# Patient Record
Sex: FEMALE | Race: WHITE | NOT HISPANIC OR LATINO
[De-identification: names, ages, dates, MRNs, and addresses within clinical notes are randomized per-mention and may not be internally consistent; named-entity substitution may affect disease eponyms.]

---

## 2017-12-13 ENCOUNTER — RX ONLY (OUTPATIENT)
Age: 52
Setting detail: RX ONLY
End: 2017-12-13

## 2017-12-13 ENCOUNTER — APPOINTMENT (RX ONLY)
Dept: URBAN - METROPOLITAN AREA CLINIC 31 | Facility: CLINIC | Age: 52
Setting detail: DERMATOLOGY
End: 2017-12-13

## 2017-12-13 DIAGNOSIS — L57.3 POIKILODERMA OF CIVATTE: ICD-10-CM

## 2017-12-13 DIAGNOSIS — L70.0 ACNE VULGARIS: ICD-10-CM

## 2017-12-13 DIAGNOSIS — L57.8 OTHER SKIN CHANGES DUE TO CHRONIC EXPOSURE TO NONIONIZING RADIATION: ICD-10-CM

## 2017-12-13 DIAGNOSIS — L82.1 OTHER SEBORRHEIC KERATOSIS: ICD-10-CM

## 2017-12-13 DIAGNOSIS — D18.0 HEMANGIOMA: ICD-10-CM

## 2017-12-13 DIAGNOSIS — D22 MELANOCYTIC NEVI: ICD-10-CM

## 2017-12-13 DIAGNOSIS — L81.4 OTHER MELANIN HYPERPIGMENTATION: ICD-10-CM

## 2017-12-13 PROBLEM — L57.0 ACTINIC KERATOSIS: Status: ACTIVE | Noted: 2017-12-13

## 2017-12-13 PROBLEM — D22.71 MELANOCYTIC NEVI OF RIGHT LOWER LIMB, INCLUDING HIP: Status: ACTIVE | Noted: 2017-12-13

## 2017-12-13 PROBLEM — D22.72 MELANOCYTIC NEVI OF LEFT LOWER LIMB, INCLUDING HIP: Status: ACTIVE | Noted: 2017-12-13

## 2017-12-13 PROBLEM — D18.01 HEMANGIOMA OF SKIN AND SUBCUTANEOUS TISSUE: Status: ACTIVE | Noted: 2017-12-13

## 2017-12-13 PROBLEM — D22.5 MELANOCYTIC NEVI OF TRUNK: Status: ACTIVE | Noted: 2017-12-13

## 2017-12-13 PROCEDURE — ? OBSERVATION AND MEASURE

## 2017-12-13 PROCEDURE — 99213 OFFICE O/P EST LOW 20 MIN: CPT

## 2017-12-13 PROCEDURE — ? COUNSELING

## 2017-12-13 RX ORDER — CLINDAMYCIN PHOSPHATE AND BENZOYL PEROXIDE 10; 50 MG/G; MG/G
GEL TOPICAL
Qty: 1 | Refills: 2 | Status: ERX | COMMUNITY
Start: 2017-12-13

## 2017-12-13 ASSESSMENT — LOCATION ZONE DERM
LOCATION ZONE: ARM
LOCATION ZONE: NECK
LOCATION ZONE: FACE
LOCATION ZONE: TOE
LOCATION ZONE: TRUNK

## 2017-12-13 ASSESSMENT — LOCATION DETAILED DESCRIPTION DERM
LOCATION DETAILED: UPPER STERNUM
LOCATION DETAILED: LEFT DISTAL DORSAL FOREARM
LOCATION DETAILED: LEFT LATERAL 4TH TOE
LOCATION DETAILED: LEFT INFERIOR ANTERIOR NECK
LOCATION DETAILED: RIGHT DISTAL DORSAL FOREARM
LOCATION DETAILED: RIGHT SUPERIOR MEDIAL MIDBACK
LOCATION DETAILED: INFERIOR THORACIC SPINE
LOCATION DETAILED: RIGHT PROXIMAL DORSAL FOREARM
LOCATION DETAILED: RIGHT MEDIAL 2ND TOE
LOCATION DETAILED: STERNAL NOTCH
LOCATION DETAILED: LEFT PROXIMAL DORSAL FOREARM
LOCATION DETAILED: LEFT INFERIOR MEDIAL MIDBACK
LOCATION DETAILED: LEFT INFERIOR CENTRAL MALAR CHEEK
LOCATION DETAILED: RIGHT INFERIOR CENTRAL MALAR CHEEK
LOCATION DETAILED: LEFT CENTRAL BUCCAL CHEEK

## 2017-12-13 ASSESSMENT — LOCATION SIMPLE DESCRIPTION DERM
LOCATION SIMPLE: RIGHT LOWER BACK
LOCATION SIMPLE: LEFT ANTERIOR NECK
LOCATION SIMPLE: LEFT CHEEK
LOCATION SIMPLE: RIGHT 2ND TOE
LOCATION SIMPLE: LEFT LOWER BACK
LOCATION SIMPLE: RIGHT FOREARM
LOCATION SIMPLE: UPPER BACK
LOCATION SIMPLE: RIGHT CHEEK
LOCATION SIMPLE: LEFT 4TH TOE
LOCATION SIMPLE: LEFT FOREARM
LOCATION SIMPLE: CHEST

## 2019-01-03 ENCOUNTER — APPOINTMENT (RX ONLY)
Dept: URBAN - METROPOLITAN AREA CLINIC 20 | Facility: CLINIC | Age: 54
Setting detail: DERMATOLOGY
End: 2019-01-03

## 2019-01-03 DIAGNOSIS — L663 OTHER SPECIFIED DISEASES OF HAIR AND HAIR FOLLICLES: ICD-10-CM

## 2019-01-03 DIAGNOSIS — L738 OTHER SPECIFIED DISEASES OF HAIR AND HAIR FOLLICLES: ICD-10-CM

## 2019-01-03 DIAGNOSIS — Z71.89 OTHER SPECIFIED COUNSELING: ICD-10-CM

## 2019-01-03 DIAGNOSIS — L81.4 OTHER MELANIN HYPERPIGMENTATION: ICD-10-CM

## 2019-01-03 DIAGNOSIS — L73.9 FOLLICULAR DISORDER, UNSPECIFIED: ICD-10-CM

## 2019-01-03 DIAGNOSIS — L82.1 OTHER SEBORRHEIC KERATOSIS: ICD-10-CM

## 2019-01-03 DIAGNOSIS — L57.0 ACTINIC KERATOSIS: ICD-10-CM

## 2019-01-03 DIAGNOSIS — D18.0 HEMANGIOMA: ICD-10-CM

## 2019-01-03 DIAGNOSIS — D22 MELANOCYTIC NEVI: ICD-10-CM

## 2019-01-03 PROBLEM — D22.72 MELANOCYTIC NEVI OF LEFT LOWER LIMB, INCLUDING HIP: Status: ACTIVE | Noted: 2019-01-03

## 2019-01-03 PROBLEM — D22.71 MELANOCYTIC NEVI OF RIGHT LOWER LIMB, INCLUDING HIP: Status: ACTIVE | Noted: 2019-01-03

## 2019-01-03 PROBLEM — D22.62 MELANOCYTIC NEVI OF LEFT UPPER LIMB, INCLUDING SHOULDER: Status: ACTIVE | Noted: 2019-01-03

## 2019-01-03 PROBLEM — D22.5 MELANOCYTIC NEVI OF TRUNK: Status: ACTIVE | Noted: 2019-01-03

## 2019-01-03 PROBLEM — D22.61 MELANOCYTIC NEVI OF RIGHT UPPER LIMB, INCLUDING SHOULDER: Status: ACTIVE | Noted: 2019-01-03

## 2019-01-03 PROBLEM — D18.01 HEMANGIOMA OF SKIN AND SUBCUTANEOUS TISSUE: Status: ACTIVE | Noted: 2019-01-03

## 2019-01-03 PROBLEM — L02.223 FURUNCLE OF CHEST WALL: Status: ACTIVE | Noted: 2019-01-03

## 2019-01-03 PROCEDURE — ? COUNSELING

## 2019-01-03 PROCEDURE — 17000 DESTRUCT PREMALG LESION: CPT

## 2019-01-03 PROCEDURE — 99214 OFFICE O/P EST MOD 30 MIN: CPT | Mod: 25

## 2019-01-03 PROCEDURE — ? LIQUID NITROGEN

## 2019-01-03 PROCEDURE — ? ADDITIONAL NOTES

## 2019-01-03 ASSESSMENT — LOCATION DETAILED DESCRIPTION DERM
LOCATION DETAILED: RIGHT PROXIMAL DORSAL FOREARM
LOCATION DETAILED: RIGHT ANTERIOR DISTAL THIGH
LOCATION DETAILED: LEFT ANTERIOR DISTAL THIGH
LOCATION DETAILED: RIGHT ANTERIOR PROXIMAL THIGH
LOCATION DETAILED: NASAL SUPRATIP
LOCATION DETAILED: LEFT PROXIMAL DORSAL FOREARM
LOCATION DETAILED: LEFT MEDIAL SUPERIOR CHEST
LOCATION DETAILED: RIGHT MID-UPPER BACK
LOCATION DETAILED: LEFT FOREHEAD
LOCATION DETAILED: LEFT ANTERIOR PROXIMAL THIGH
LOCATION DETAILED: LEFT ANTERIOR PROXIMAL UPPER ARM
LOCATION DETAILED: XIPHOID
LOCATION DETAILED: RIGHT INFERIOR UPPER BACK
LOCATION DETAILED: LEFT INFERIOR CENTRAL MALAR CHEEK
LOCATION DETAILED: RIGHT ANTERIOR PROXIMAL UPPER ARM
LOCATION DETAILED: UPPER STERNUM
LOCATION DETAILED: RIGHT SUPERIOR MEDIAL UPPER BACK

## 2019-01-03 ASSESSMENT — LOCATION ZONE DERM
LOCATION ZONE: FACE
LOCATION ZONE: NOSE
LOCATION ZONE: ARM
LOCATION ZONE: LEG
LOCATION ZONE: TRUNK

## 2019-01-03 ASSESSMENT — LOCATION SIMPLE DESCRIPTION DERM
LOCATION SIMPLE: LEFT CHEEK
LOCATION SIMPLE: LEFT UPPER ARM
LOCATION SIMPLE: LEFT FOREHEAD
LOCATION SIMPLE: RIGHT FOREARM
LOCATION SIMPLE: RIGHT UPPER ARM
LOCATION SIMPLE: CHEST
LOCATION SIMPLE: LEFT THIGH
LOCATION SIMPLE: ABDOMEN
LOCATION SIMPLE: RIGHT UPPER BACK
LOCATION SIMPLE: RIGHT THIGH
LOCATION SIMPLE: NOSE
LOCATION SIMPLE: LEFT FOREARM

## 2020-08-27 ENCOUNTER — OFFICE VISIT (OUTPATIENT)
Dept: CARDIOLOGY | Facility: CLINIC | Age: 55
End: 2020-08-27
Payer: COMMERCIAL

## 2020-08-27 VITALS
BODY MASS INDEX: 27.14 KG/M2 | OXYGEN SATURATION: 96 % | WEIGHT: 159 LBS | DIASTOLIC BLOOD PRESSURE: 100 MMHG | SYSTOLIC BLOOD PRESSURE: 178 MMHG | HEIGHT: 64 IN | HEART RATE: 82 BPM

## 2020-08-27 DIAGNOSIS — F41.9 ANXIETY: ICD-10-CM

## 2020-08-27 DIAGNOSIS — R07.89 OTHER CHEST PAIN: ICD-10-CM

## 2020-08-27 DIAGNOSIS — E78.2 MIXED HYPERLIPIDEMIA: ICD-10-CM

## 2020-08-27 DIAGNOSIS — E78.49 FAMILIAL HYPERLIPIDEMIA, HIGH LDL: ICD-10-CM

## 2020-08-27 DIAGNOSIS — R73.01 IFG (IMPAIRED FASTING GLUCOSE): ICD-10-CM

## 2020-08-27 DIAGNOSIS — Z82.49 FAMILY HISTORY OF CORONARY ARTERY DISEASE IN FATHER: ICD-10-CM

## 2020-08-27 DIAGNOSIS — I10 ESSENTIAL HYPERTENSION: ICD-10-CM

## 2020-08-27 LAB — EKG IMPRESSION: NORMAL

## 2020-08-27 PROCEDURE — 93000 ELECTROCARDIOGRAM COMPLETE: CPT | Performed by: INTERNAL MEDICINE

## 2020-08-27 PROCEDURE — 99204 OFFICE O/P NEW MOD 45 MIN: CPT | Performed by: INTERNAL MEDICINE

## 2020-08-27 RX ORDER — FLUTICASONE PROPIONATE 50 UG/1
SPRAY, METERED NASAL PRN
COMMUNITY
Start: 2020-06-02

## 2020-08-27 RX ORDER — OMEPRAZOLE 20 MG/1
TABLET, DELAYED RELEASE ORAL PRN
COMMUNITY
Start: 2020-07-31

## 2020-08-27 RX ORDER — CHLORTHALIDONE 50 MG/1
TABLET ORAL PRN
COMMUNITY
Start: 2020-08-04 | End: 2023-06-14

## 2020-08-27 NOTE — LETTER
[facility] [place] [facility] [address],   [address]nd [place] [address]  Phone: [phone]  Fax: [phone]              Susan Sweeney  1965    Encounter Date: 2020    Sachi Low M.D.          PROGRESS NOTE:  Subjective:   Chief Complaint:   Chief Complaint   Patient presents with   • Chest Pain       Susan Sweeney is a 54 y.o. female who is self-referred for chest pain, hyperlipidemia, FH CAD.    Saw cardiology before with stress test, normal, remote.    Notes when she lies on her right side, she has a strange feeling, feels like a squeezing, sometimes sharp.  PCP gave her PPI, did help a little bit but returning.    Get some right arm sensations, tingling, has some shoulder issues.  Eval in ED For this in 2016, trops normal x 3.    Gets occasional skipped beats, happening for years, not alarming, no other sx.    Has some anxiety, uses xanax sparingly, does help.    Works out daily, up to 2 miles, some intensity for 15 sec, then 45 sec rest.  She is not limited by chest pain, pressure or tightness with activity.   No significant dyspnea on exertion, orthopnea or lower extremity swelling.   No lightheadedness, or presyncope/syncope.   No symptoms of leg claudication.   No stroke/TIA like symptoms.    Has hyperlipidemia, LDL and triglycerides are elevated, LDL is 172, HDL normal, not sure if fasting.  Tells me FU lipids have been normal.    Has hypertension, no meds.  Prior diuretics for edema related to menses.  Prior low K.    Mother  at 54 of MI, was a smoker.    No prior smoking history.  No history of diabetes.  No history of autoimmune disease such as lupus or rheumatoid arthritis.  No chronic kidney disease.  No ETOH overuse. Rare.  No caffeine overuse.  No recreation substance use.    Son, Ricardo,  of OD at 27. Was told he had an abnormal ECG but not taken seriously with hx drug use, was on meds for HTN.  .  Has  another son, Ananth.    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  L[place] to vacation in [facility].    DATA REVIEWED by me:  ECG 2020  Sinus, 75, borderline ST depression    Echo    Most recent labs:       Lab Results   Component Value Date/Time    HEMOGLOBIN 13.3 2016 07:20 AM    HEMATOCRIT 39.0 2016 07:20 AM    MCV 94.4 2016 07:20 AM      Lab Results   Component Value Date/Time    SODIUM 140 2016 07:20 AM    POTASSIUM 3.5 2016 07:20 AM    CHLORIDE 104 2016 07:20 AM    CO2 25 2016 07:20 AM    GLUCOSE 112 (H) 2016 07:20 AM    BUN 9 2016 07:20 AM    CREATININE 0.8 2016 07:20 AM      Lab Results   Component Value Date/Time    ASTSGOT 19 2016 03:25 PM    ALTSGPT 24 2016 03:25 PM    ALBUMIN 3.9 2016 03:25 PM      Lab Results   Component Value Date/Time    CHOLSTRLTOT 287 (H) 2016 07:20 AM     (H) 2016 07:20 AM    HDL 59.0 2016 07:20 AM    TRIGLYCERIDE 279 (H) 2016 07:20 AM           Past Medical History:   Diagnosis Date   • Migraine      Past Surgical History:   Procedure Laterality Date   • CYST EXCISION Bilateral     breasts   • HYSTERECTOMY LAPAROSCOPY      Partial hysterectomy      Family History   Problem Relation Age of Onset   • Heart Disease Mother          of MI at 54, smoker     Social History     Socioeconomic History   • Marital status:      Spouse name: Not on file   • Number of children: Not on file   • Years of education: Not on file   • Highest education level: Not on file   Occupational History   • Not on file   Social Needs   • Financial resource strain: Not on file   • Food insecurity     Worry: Not on file     Inability: Not on file   • Transportation needs     Medical: Not on file     Non-medical: Not on file   Tobacco Use   • Smoking status: Never Smoker   • Smokeless tobacco: Never Used   Substance and Sexual Activity   • Alcohol use: Yes     Comment:  "occasionally    • Drug use: No   • Sexual activity: Not on file   Lifestyle   • Physical activity     Days per week: Not on file     Minutes per session: Not on file   • Stress: Not on file   Relationships   • Social connections     Talks on phone: Not on file     Gets together: Not on file     Attends Quaker service: Not on file     Active member of club or organization: Not on file     Attends meetings of clubs or organizations: Not on file     Relationship status: Not on file   • Intimate partner violence     Fear of current or ex partner: Not on file     Emotionally abused: Not on file     Physically abused: Not on file     Forced sexual activity: Not on file   Other Topics Concern   • Not on file   Social History Narrative   • Not on file     Allergies   Allergen Reactions   • Sulfa Drugs Hives       Current Outpatient Medications   Medication Sig Dispense Refill   • chlorthalidone (HYGROTON) 50 MG Tab      • KLS ALLER-SHANTEL 50 MCG/ACT nasal spray      • KLS OMPERAZOLE 20 MG Tablet Delayed Response delayed-release tablet      • SUMAtriptan Succinate (IMITREX PO) Take  by mouth.     • Aspirin-Acetaminophen-Caffeine (EXCEDRIN PO) Take  by mouth.     • hydrochlorothiazide (HYDRODIURIL) 50 MG Tab Take 50 mg by mouth every day.     • vitamin D (CHOLECALCIFEROL) 1000 UNIT Tab Take 1,000 Units by mouth every day.     • alprazolam (XANAX) 0.25 MG Tab Take 0.25 mg by mouth at bedtime as needed for Sleep (patient takes 1/2 of 0.25).       No current facility-administered medications for this visit.        ROS  All others systems reviewed and negative.     Objective:     BP (!) 178/100 (BP Location: Right arm, Patient Position: Sitting)   Pulse 82   Ht 1.626 m (5' 4\")   Wt 72.1 kg (159 lb)   SpO2 96%  Body mass index is 27.29 kg/m².    General: No acute distress. Well nourished.  HEENT: EOM grossly intact, no scleral icterus, no pharyngeal erythema.   Neck:  No JVD, no bruits, trachea midline  CVS: RRR. Normal S1, " S2. No M/R/G. No LE edema.  2+ radial pulses, 2+ PT pulses  Resp: CTAB. No wheezing or crackles/rhonchi. Normal respiratory effort.  Abdomen: Soft, NT, no velma hepatomegaly.  MSK/Ext: No clubbing or cyanosis.  Skin: Warm and dry, no rashes.  Neurological: CN III-XII grossly intact. No focal deficits.   Psych: A&O x 3, appropriate affect, good judgement      Assessment:     1. Other chest pain  EKG   2. Mixed hyperlipidemia  Comp Metabolic Panel    Lipid Profile   3. Family history of coronary artery disease in father     4. Essential hypertension     5. Anxiety         Medical Decision Making:  Today's Assessment / Status / Plan:     -Her sx do not sound cardiac  -I am concerned about BP, she is certain it has been controlled before, will monitor it  -To reduce risk, she is near optimal, except considering statin with FH, I think a calcium score would be helpful.  -RTC 1 year      Written instructions given today:    Checking Blood Pressure:  -Blood pressure cuff, spend in the $40-65, with good return policy  -It should be automatic, upper arm, measure your arm to get the correct size, probably adult Large  -Put the cuff in place, rest arm on table near height of your heart, sit quietly for 5 min, legs uncrossed, with back support, then take your blood pressure, write it down, keep a log  -Check no more than 1 time day, maybe 4-5 times per week, try different times of day.  -Can bring your cuff to at least one appointment where it can be calibrated to a manual cuff if you are concerned.  -Goal blood pressure is at least under 130/80, ideally under 120/80.  If you think your BP is overall too high, let us know in the office, we can adjust medications, can use [employer] or call the [employer] office: [phone].    -Salt=sodium=sea salt, guidelines say stay under 2,500 mg daily but I ask for under 4,000 mg daily.  Get salt smart, start looking at labels, count it up.  Salt is hidden in everything, salad dressing, sauces,  cheese, most canned food, any processed meat.    -A calcium score is a low radiation dose CT scan to look for calcification of the heart arteries.  It is a screening tool to help assess cardiovascular risk.  It can only reveal calcified disease in the heart arteries, it is still possible to have dark plaque coronary disease that cannot be seen on the scan.  It cannot tell us if the disease is on the inside or outside of the vessel, only whether or not it is there (like a pregnancy test, it is either positive or negative). I use it as a tool to decide if statin therapy is indicated (such as Lipitor or Crestor) to help with primary prevention of heart attack.  Statin therapy provides anti-inflammatory therapy to stabilize plaque in the vesels reducing the risk of plaque rupture heart attack but in doing so often leads to further calcification of the heart arteries and therefore I do not repeat the scan as it can look worse after statin therapy.  If you have other indications to be on statin therapy then I generally do not recommend the test.  It is a personal decision.  It is an out of pocket expense of approximately $100 and can be ordered at any time.  If done at [facility], we can review the images together.  Let me know if you think this test is right for you.  Again, I typically only order it if it will /therapy, again such as starting statin therapy.    -We now know that lack of exercise is as risky as smoking or having diabetes in terms of your heart health.  Try to perform a moderate intensity exercise which includes brisk walking (swimming, cycling) at least 150 minutes a week or 30 minutes for 5 days in the week.  If you are able to perform more vigorous exercise, 70 minutes/week is sufficient. You have achieved your exercise goals.    -Take my blood work order and batch with your PCP blood work.    -You should always hear results of testing within 5 days with my interpretation, if you do not,  send a [employer] message or call the office: [phone].    Return in about 1 year (around 8/27/2021).    It is my pleasure to participate in the care of Ms. Sweeney.  Please do not hesitate to contact me with questions or concerns.    Sachi Low MD, [facility]  Cardiologist [facility]    Please note that this dictation was created using voice recognition software. I have made every reasonable attempt to correct obvious errors, but it is possible there are errors of grammar and possibly content that I did not discover before finalizing the note.      Bernard Decker M.D.  [address] [place] [address] [address] [place] [address]  Via Fax: [phone]

## 2020-08-27 NOTE — PATIENT INSTRUCTIONS
Checking Blood Pressure:  -Blood pressure cuff, spend in the $40-65, with good return policy  -It should be automatic, upper arm, measure your arm to get the correct size, probably adult Large  -Put the cuff in place, rest arm on table near height of your heart, sit quietly for 5 min, legs uncrossed, with back support, then take your blood pressure, write it down, keep a log  -Check no more than 1 time day, maybe 4-5 times per week, try different times of day.  -Can bring your cuff to at least one appointment where it can be calibrated to a manual cuff if you are concerned.  -Goal blood pressure is at least under 130/80, ideally under 120/80.  If you think your BP is overall too high, let us know in the office, we can adjust medications, can use [employer] or call the [employer] office: [phone].    -Salt=sodium=sea salt, guidelines say stay under 2,500 mg daily but I ask for under 4,000 mg daily.  Get salt smart, start looking at labels, count it up.  Salt is hidden in everything, salad dressing, sauces, cheese, most canned food, any processed meat.    -A calcium score is a low radiation dose CT scan to look for calcification of the heart arteries.  It is a screening tool to help assess cardiovascular risk.  It can only reveal calcified disease in the heart arteries, it is still possible to have dark plaque coronary disease that cannot be seen on the scan.  It cannot tell us if the disease is on the inside or outside of the vessel, only whether or not it is there (like a pregnancy test, it is either positive or negative). I use it as a tool to decide if statin therapy is indicated (such as Lipitor or Crestor) to help with primary prevention of heart attack.  Statin therapy provides anti-inflammatory therapy to stabilize plaque in the vesels reducing the risk of plaque rupture heart attack but in doing so often leads to further calcification of the heart arteries and therefore I do not repeat the scan as it can look  worse after statin therapy.  If you have other indications to be on statin therapy then I generally do not recommend the test.  It is a personal decision.  It is an out of pocket expense of approximately $100 and can be ordered at any time.  If done at [facility], we can review the images together.  Let me know if you think this test is right for you.  Again, I typically only order it if it will /therapy, again such as starting statin therapy.    -We now know that lack of exercise is as risky as smoking or having diabetes in terms of your heart health.  Try to perform a moderate intensity exercise which includes brisk walking (swimming, cycling) at least 150 minutes a week or 30 minutes for 5 days in the week.  If you are able to perform more vigorous exercise, 70 minutes/week is sufficient. You have achieved your exercise goals.    -Take my blood work order and batch with your PCP blood work.    -You should always hear results of testing within 5 days with my interpretation, if you do not, send a [employer] message or call the office: [phone].

## 2020-08-27 NOTE — PROGRESS NOTES
Subjective:   Chief Complaint:   Chief Complaint   Patient presents with   • Chest Pain       Susan Sweeney is a 54 y.o. female who is self-referred for chest pain, hyperlipidemia, FH CAD.    Saw cardiology before with stress test, normal, remote.    Notes when she lies on her right side, she has a strange feeling, feels like a squeezing, sometimes sharp.  PCP gave her PPI, did help a little bit but returning.    Get some right arm sensations, tingling, has some shoulder issues.  Eval in ED For this in 2016, trops normal x 3.    Gets occasional skipped beats, happening for years, not alarming, no other sx.    Has some anxiety, uses xanax sparingly, does help.    Works out daily, up to 2 miles, some intensity for 15 sec, then 45 sec rest.  She is not limited by chest pain, pressure or tightness with activity.   No significant dyspnea on exertion, orthopnea or lower extremity swelling.   No lightheadedness, or presyncope/syncope.   No symptoms of leg claudication.   No stroke/TIA like symptoms.    Has hyperlipidemia, LDL and triglycerides are elevated, LDL is 172, HDL normal, not sure if fasting.  Tells me FU lipids have been normal.    Has hypertension, no meds.  Prior diuretics for edema related to menses.  Prior low K.    Mother  at 54 of MI, was a smoker.    No prior smoking history.  No history of diabetes.  No history of autoimmune disease such as lupus or rheumatoid arthritis.  No chronic kidney disease.  No ETOH overuse. Rare.  No caffeine overuse.  No recreation substance use.    Son, Ricardo,  of OD at 27. Was told he had an abnormal ECG but not taken seriously with hx drug use, was on meds for HTN.  .  Has another son, Ananth.    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  [place] to vacation in [facility].    DATA REVIEWED by me:  ECG 2020  Sinus, 75, borderline ST depression    Echo none    Most recent labs:     2019 glucose 122, creatinine 0.78, sodium 140, potassium  3.9  2018 total cholesterol 304, HDL 87, triglycerides 116, , glucose 124, creatinine 0.73, potassium 3.3, sodium 138, LFTs normal, TSH 1.34  Lab Results   Component Value Date/Time    HEMOGLOBIN 13.3 2016 07:20 AM    HEMATOCRIT 39.0 2016 07:20 AM    MCV 94.4 2016 07:20 AM      Lab Results   Component Value Date/Time    SODIUM 140 2016 07:20 AM    POTASSIUM 3.5 2016 07:20 AM    CHLORIDE 104 2016 07:20 AM    CO2 25 2016 07:20 AM    GLUCOSE 112 (H) 2016 07:20 AM    BUN 9 2016 07:20 AM    CREATININE 0.8 2016 07:20 AM      Lab Results   Component Value Date/Time    ASTSGOT 19 2016 03:25 PM    ALTSGPT 24 2016 03:25 PM    ALBUMIN 3.9 2016 03:25 PM      Lab Results   Component Value Date/Time    CHOLSTRLTOT 287 (H) 2016 07:20 AM     (H) 2016 07:20 AM    HDL 59.0 2016 07:20 AM    TRIGLYCERIDE 279 (H) 2016 07:20 AM           Past Medical History:   Diagnosis Date   • Migraine      Past Surgical History:   Procedure Laterality Date   • CYST EXCISION Bilateral     breasts   • HYSTERECTOMY LAPAROSCOPY      Partial hysterectomy      Family History   Problem Relation Age of Onset   • Heart Disease Mother          of MI at 54, smoker     Social History     Socioeconomic History   • Marital status:      Spouse name: Not on file   • Number of children: Not on file   • Years of education: Not on file   • Highest education level: Not on file   Occupational History   • Not on file   Social Needs   • Financial resource strain: Not on file   • Food insecurity     Worry: Not on file     Inability: Not on file   • Transportation needs     Medical: Not on file     Non-medical: Not on file   Tobacco Use   • Smoking status: Never Smoker   • Smokeless tobacco: Never Used   Substance and Sexual Activity   • Alcohol use: Yes     Comment: occasionally    • Drug use: No   • Sexual activity: Not on file  "  Lifestyle   • Physical activity     Days per week: Not on file     Minutes per session: Not on file   • Stress: Not on file   Relationships   • Social connections     Talks on phone: Not on file     Gets together: Not on file     Attends Oriental orthodox service: Not on file     Active member of club or organization: Not on file     Attends meetings of clubs or organizations: Not on file     Relationship status: Not on file   • Intimate partner violence     Fear of current or ex partner: Not on file     Emotionally abused: Not on file     Physically abused: Not on file     Forced sexual activity: Not on file   Other Topics Concern   • Not on file   Social History Narrative   • Not on file     Allergies   Allergen Reactions   • Sulfa Drugs Hives       Current Outpatient Medications   Medication Sig Dispense Refill   • chlorthalidone (HYGROTON) 50 MG Tab      • KLS ALLER-SHANTEL 50 MCG/ACT nasal spray      • KLS OMPERAZOLE 20 MG Tablet Delayed Response delayed-release tablet      • SUMAtriptan Succinate (IMITREX PO) Take  by mouth.     • Aspirin-Acetaminophen-Caffeine (EXCEDRIN PO) Take  by mouth.     • hydrochlorothiazide (HYDRODIURIL) 50 MG Tab Take 50 mg by mouth every day.     • vitamin D (CHOLECALCIFEROL) 1000 UNIT Tab Take 1,000 Units by mouth every day.     • alprazolam (XANAX) 0.25 MG Tab Take 0.25 mg by mouth at bedtime as needed for Sleep (patient takes 1/2 of 0.25).       No current facility-administered medications for this visit.        ROS  All others systems reviewed and negative.     Objective:     BP (!) 178/100 (BP Location: Right arm, Patient Position: Sitting)   Pulse 82   Ht 1.626 m (5' 4\")   Wt 72.1 kg (159 lb)   SpO2 96%  Body mass index is 27.29 kg/m².    General: No acute distress. Well nourished.  HEENT: EOM grossly intact, no scleral icterus, no pharyngeal erythema.   Neck:  No JVD, no bruits, trachea midline  CVS: RRR. Normal S1, S2. No M/R/G. No LE edema.  2+ radial pulses, 2+ PT pulses  Resp: " CTAB. No wheezing or crackles/rhonchi. Normal respiratory effort.  Abdomen: Soft, NT, no velma hepatomegaly.  MSK/Ext: No clubbing or cyanosis.  Skin: Warm and dry, no rashes.  Neurological: CN III-XII grossly intact. No focal deficits.   Psych: A&O x 3, appropriate affect, good judgement      Assessment:     1. Other chest pain  EKG   2. Mixed hyperlipidemia  Comp Metabolic Panel    Lipid Profile   3. Family history of coronary artery disease in father     4. Essential hypertension     5. Anxiety     6. Familial hyperlipidemia, high LDL     7. IFG (impaired fasting glucose)         Medical Decision Making:  Today's Assessment / Status / Plan:     -Her sx do not sound cardiac  -I am concerned about BP, she is certain it has been controlled before, will monitor it  -To reduce risk, she is near optimal, except considering statin with FH, I think a calcium score would be helpful.  -RTC 1 year    Addendum: Labs received, LDL cholesterol 193 indicating need to treat cholesterol for familial hyperlipidemia, glucose 122 consistent with impaired fasting glucose.  I will request appointment change to 3-month follow-up.    Written instructions given today:    Checking Blood Pressure:  -Blood pressure cuff, spend in the $40-65, with good return policy  -It should be automatic, upper arm, measure your arm to get the correct size, probably adult Large  -Put the cuff in place, rest arm on table near height of your heart, sit quietly for 5 min, legs uncrossed, with back support, then take your blood pressure, write it down, keep a log  -Check no more than 1 time day, maybe 4-5 times per week, try different times of day.  -Can bring your cuff to at least one appointment where it can be calibrated to a manual cuff if you are concerned.  -Goal blood pressure is at least under 130/80, ideally under 120/80.  If you think your BP is overall too high, let us know in the office, we can adjust medications, can use MyChart or call the [facility]  office: [phone].    -Salt=sodium=sea salt, guidelines say stay under 2,500 mg daily but I ask for under 4,000 mg daily.  Get salt smart, start looking at labels, count it up.  Salt is hidden in everything, salad dressing, sauces, cheese, most canned food, any processed meat.    -A calcium score is a low radiation dose CT scan to look for calcification of the heart arteries.  It is a screening tool to help assess cardiovascular risk.  It can only reveal calcified disease in the heart arteries, it is still possible to have dark plaque coronary disease that cannot be seen on the scan.  It cannot tell us if the disease is on the inside or outside of the vessel, only whether or not it is there (like a pregnancy test, it is either positive or negative). I use it as a tool to decide if statin therapy is indicated (such as Lipitor or Crestor) to help with primary prevention of heart attack.  Statin therapy provides anti-inflammatory therapy to stabilize plaque in the vesels reducing the risk of plaque rupture heart attack but in doing so often leads to further calcification of the heart arteries and therefore I do not repeat the scan as it can look worse after statin therapy.  If you have other indications to be on statin therapy then I generally do not recommend the test.  It is a personal decision.  It is an out of pocket expense of approximately $100 and can be ordered at any time.  If done at [facility], we can review the images together.  Let me know if you think this test is right for you.  Again, I typically only order it if it will /therapy, again such as starting statin therapy.    -We now know that lack of exercise is as risky as smoking or having diabetes in terms of your heart health.  Try to perform a moderate intensity exercise which includes brisk walking (swimming, cycling) at least 150 minutes a week or 30 minutes for 5 days in the week.  If you are able to perform more vigorous exercise,  70 minutes/week is sufficient. You have achieved your exercise goals.    -Take my blood work order and batch with your PCP blood work.    -You should always hear results of testing within 5 days with my interpretation, if you do not, send a [employer] message or call the office: [phone].    Return in about 3 months (around 11/27/2020).    It is my pleasure to participate in the care of Ms. Sweeney.  Please do not hesitate to contact me with questions or concerns.    Sachi oLw MD, [facility]  Cardiologist [facility]    Please note that this dictation was created using voice recognition software. I have made every reasonable attempt to correct obvious errors, but it is possible there are errors of grammar and possibly content that I did not discover before finalizing the note.

## 2020-08-27 NOTE — LETTER
[facility] [address],   [address]nd [place] [address]  Phone: [phone]  Fax: [phone]              Susan Sweeney  1965    Encounter Date: 2020    Sachi Low M.D.          PROGRESS NOTE:  Subjective:   Chief Complaint:   Chief Complaint   Patient presents with   • Chest Pain       Susan Sweeney is a 54 y.o. female who is self-referred for chest pain, hyperlipidemia, FH CAD.    Saw cardiology before with stress test, normal, remote.    Notes when she lies on her right side, she has a strange feeling, feels like a squeezing, sometimes sharp.  PCP gave her PPI, did help a little bit but returning.    Get some right arm sensations, tingling, has some shoulder issues.  Eval in ED For this in 2016, trops normal x 3.    Gets occasional skipped beats, happening for years, not alarming, no other sx.    Has some anxiety, uses xanax sparingly, does help.    Works out daily, up to 2 miles, some intensity for 15 sec, then 45 sec rest.  She is not limited by chest pain, pressure or tightness with activity.   No significant dyspnea on exertion, orthopnea or lower extremity swelling.   No lightheadedness, or presyncope/syncope.   No symptoms of leg claudication.   No stroke/TIA like symptoms.    Has hyperlipidemia, LDL and triglycerides are elevated, LDL is 172, HDL normal, not sure if fasting.  Tells me FU lipids have been normal.    Has hypertension, no meds.  Prior diuretics for edema related to menses.  Prior low K.    Mother  at 54 of MI, was a smoker.    No prior smoking history.  No history of diabetes.  No history of autoimmune disease such as lupus or rheumatoid arthritis.  No chronic kidney disease.  No ETOH overuse. Rare.  No caffeine overuse.  No recreation substance use.    Son, Ricardo,  of OD at 27. Was told he had an abnormal ECG but not taken seriously with hx drug use, was on meds for HTN.  .  Has  another son, Ananth.    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  Liked to vacation in [facility].    DATA REVIEWED by me:  ECG 2020  Sinus, 74, borderline ST depression    Echo none    Most recent labs:     2019 glucose 122, creatinine 0.78, sodium 140, potassium 3.9  2018 total cholesterol 304, HDL 87, triglycerides 116, , glucose 124, creatinine 0.73, potassium 3.3, sodium 138, LFTs normal, TSH 1.34  Lab Results   Component Value Date/Time    HEMOGLOBIN 13.3 2016 07:20 AM    HEMATOCRIT 39.0 2016 07:20 AM    MCV 94.4 2016 07:20 AM      Lab Results   Component Value Date/Time    SODIUM 140 2016 07:20 AM    POTASSIUM 3.5 2016 07:20 AM    CHLORIDE 104 2016 07:20 AM    CO2 25 2016 07:20 AM    GLUCOSE 112 (H) 2016 07:20 AM    BUN 9 2016 07:20 AM    CREATININE 0.8 2016 07:20 AM      Lab Results   Component Value Date/Time    ASTSGOT 19 2016 03:25 PM    ALTSGPT 24 2016 03:25 PM    ALBUMIN 3.9 2016 03:25 PM      Lab Results   Component Value Date/Time    CHOLSTRLTOT 287 (H) 2016 07:20 AM     (H) 2016 07:20 AM    HDL 59.0 2016 07:20 AM    TRIGLYCERIDE 279 (H) 2016 07:20 AM           Past Medical History:   Diagnosis Date   • Migraine      Past Surgical History:   Procedure Laterality Date   • CYST EXCISION Bilateral     breasts   • HYSTERECTOMY LAPAROSCOPY      Partial hysterectomy      Family History   Problem Relation Age of Onset   • Heart Disease Mother          of MI at 54, smoker     Social History     Socioeconomic History   • Marital status:      Spouse name: Not on file   • Number of children: Not on file   • Years of education: Not on file   • Highest education level: Not on file   Occupational History   • Not on file   Social Needs   • Financial resource strain: Not on file   • Food insecurity     Worry: Not on file     Inability: Not on file   •  "Transportation needs     Medical: Not on file     Non-medical: Not on file   Tobacco Use   • Smoking status: Never Smoker   • Smokeless tobacco: Never Used   Substance and Sexual Activity   • Alcohol use: Yes     Comment: occasionally    • Drug use: No   • Sexual activity: Not on file   Lifestyle   • Physical activity     Days per week: Not on file     Minutes per session: Not on file   • Stress: Not on file   Relationships   • Social connections     Talks on phone: Not on file     Gets together: Not on file     Attends [employer] service: Not on file     Active member of club or organization: Not on file     Attends meetings of clubs or organizations: Not on file     Relationship status: Not on file   • Intimate partner violence     Fear of current or ex partner: Not on file     Emotionally abused: Not on file     Physically abused: Not on file     Forced sexual activity: Not on file   Other Topics Concern   • Not on file   Social History Narrative   • Not on file     Allergies   Allergen Reactions   • Sulfa Drugs Hives       Current Outpatient Medications   Medication Sig Dispense Refill   • chlorthalidone (HYGROTON) 50 MG Tab      • KLS ALLER-SHANTEL 50 MCG/ACT nasal spray      • KLS OMPERAZOLE 20 MG Tablet Delayed Response delayed-release tablet      • SUMAtriptan Succinate (IMITREX PO) Take  by mouth.     • Aspirin-Acetaminophen-Caffeine (EXCEDRIN PO) Take  by mouth.     • hydrochlorothiazide (HYDRODIURIL) 50 MG Tab Take 50 mg by mouth every day.     • vitamin D (CHOLECALCIFEROL) 1000 UNIT Tab Take 1,000 Units by mouth every day.     • alprazolam (XANAX) 0.25 MG Tab Take 0.25 mg by mouth at bedtime as needed for Sleep (patient takes 1/2 of 0.25).       No current facility-administered medications for this visit.        ROS  All others systems reviewed and negative.     Objective:     BP (!) 178/100 (BP Location: Right arm, Patient Position: Sitting)   Pulse 82   Ht 1.626 m (5' 4\")   Wt 72.1 kg (159 lb)   SpO2 " 96%  Body mass index is 27.29 kg/m².    General: No acute distress. Well nourished.  HEENT: EOM grossly intact, no scleral icterus, no pharyngeal erythema.   Neck:  No JVD, no bruits, trachea midline  CVS: RRR. Normal S1, S2. No M/R/G. No LE edema.  2+ radial pulses, 2+ PT pulses  Resp: CTAB. No wheezing or crackles/rhonchi. Normal respiratory effort.  Abdomen: Soft, NT, no evlma hepatomegaly.  MSK/Ext: No clubbing or cyanosis.  Skin: Warm and dry, no rashes.  Neurological: CN III-XII grossly intact. No focal deficits.   Psych: A&O x 3, appropriate affect, good judgement      Assessment:     1. Other chest pain  EKG   2. Mixed hyperlipidemia  Comp Metabolic Panel    Lipid Profile   3. Family history of coronary artery disease in father     4. Essential hypertension     5. Anxiety     6. Familial hyperlipidemia, high LDL     7. IFG (impaired fasting glucose)         Medical Decision Making:  Today's Assessment / Status / Plan:     -Her sx do not sound cardiac  -I am concerned about BP, she is certain it has been controlled before, will monitor it  -To reduce risk, she is near optimal, except considering statin with FH, I think a calcium score would be helpful.  -RTC 1 year    Addendum: Labs received, LDL cholesterol 193 indicating need to treat cholesterol for familial hyperlipidemia, glucose 122 consistent with impaired fasting glucose.  I will request appointment change to 3-month follow-up.    Written instructions given today:    Checking Blood Pressure:  -Blood pressure cuff, spend in the $40-65, with good return policy  -It should be automatic, upper arm, measure your arm to get the correct size, probably adult Large  -Put the cuff in place, rest arm on table near height of your heart, sit quietly for 5 min, legs uncrossed, with back support, then take your blood pressure, write it down, keep a log  -Check no more than 1 time day, maybe 4-5 times per week, try different times of day.  -Can bring your cuff to at  least one appointment where it can be calibrated to a manual cuff if you are concerned.  -Goal blood pressure is at least under 130/80, ideally under 120/80.  If you think your BP is overall too high, let us know in the office, we can adjust medications, can use [employer] or call the [place] office: [phone].    -Salt=sodium=sea salt, guidelines say stay under 2,500 mg daily but I ask for under 4,000 mg daily.  Get salt smart, start looking at labels, count it up.  Salt is hidden in everything, salad dressing, sauces, cheese, most canned food, any processed meat.    -A calcium score is a low radiation dose CT scan to look for calcification of the heart arteries.  It is a screening tool to help assess cardiovascular risk.  It can only reveal calcified disease in the heart arteries, it is still possible to have dark plaque coronary disease that cannot be seen on the scan.  It cannot tell us if the disease is on the inside or outside of the vessel, only whether or not it is there (like a pregnancy test, it is either positive or negative). I use it as a tool to decide if statin therapy is indicated (such as Lipitor or Crestor) to help with primary prevention of heart attack.  Statin therapy provides anti-inflammatory therapy to stabilize plaque in the vesels reducing the risk of plaque rupture heart attack but in doing so often leads to further calcification of the heart arteries and therefore I do not repeat the scan as it can look worse after statin therapy.  If you have other indications to be on statin therapy then I generally do not recommend the test.  It is a personal decision.  It is an out of pocket expense of approximately $100 and can be ordered at any time.  If done at [facility], we can review the images together.  Let me know if you think this test is right for you.  Again, I typically only order it if it will /therapy, again such as starting statin therapy.    -We now know that lack of  exercise is as risky as smoking or having diabetes in terms of your heart health.  Try to perform a moderate intensity exercise which includes brisk walking (swimming, cycling) at least 150 minutes a week or 30 minutes for 5 days in the week.  If you are able to perform more vigorous exercise, 70 minutes/week is sufficient. You have achieved your exercise goals.    -Take my blood work order and batch with your PCP blood work.    -You should always hear results of testing within 5 days with my interpretation, if you do not, send a [facility] message or call the office: [phone].    Return in about 3 months (around 11/27/2020).    It is my pleasure to participate in the care of Ms. Sweeney.  Please do not hesitate to contact me with questions or concerns.    Sachi Low MD, [facility]  Cardiologist [facility]    Please note that this dictation was created using voice recognition software. I have made every reasonable attempt to correct obvious errors, but it is possible there are errors of grammar and possibly content that I did not discover before finalizing the note.      Bernard Decker M.D.  [address] [place] [address] [address] [place] [address]  Via Fax: [phone]

## 2020-09-29 ENCOUNTER — RX ONLY (OUTPATIENT)
Age: 55
Setting detail: RX ONLY
End: 2020-09-29

## 2020-09-29 ENCOUNTER — APPOINTMENT (RX ONLY)
Dept: URBAN - METROPOLITAN AREA CLINIC 31 | Facility: CLINIC | Age: 55
Setting detail: DERMATOLOGY
End: 2020-09-29

## 2020-09-29 DIAGNOSIS — D22 MELANOCYTIC NEVI: ICD-10-CM

## 2020-09-29 DIAGNOSIS — D18.0 HEMANGIOMA: ICD-10-CM

## 2020-09-29 DIAGNOSIS — L82.1 OTHER SEBORRHEIC KERATOSIS: ICD-10-CM

## 2020-09-29 DIAGNOSIS — L81.4 OTHER MELANIN HYPERPIGMENTATION: ICD-10-CM

## 2020-09-29 PROBLEM — D22.5 MELANOCYTIC NEVI OF TRUNK: Status: ACTIVE | Noted: 2020-09-29

## 2020-09-29 PROBLEM — D22.62 MELANOCYTIC NEVI OF LEFT UPPER LIMB, INCLUDING SHOULDER: Status: ACTIVE | Noted: 2020-09-29

## 2020-09-29 PROBLEM — D22.71 MELANOCYTIC NEVI OF RIGHT LOWER LIMB, INCLUDING HIP: Status: ACTIVE | Noted: 2020-09-29

## 2020-09-29 PROBLEM — D18.01 HEMANGIOMA OF SKIN AND SUBCUTANEOUS TISSUE: Status: ACTIVE | Noted: 2020-09-29

## 2020-09-29 PROBLEM — D22.61 MELANOCYTIC NEVI OF RIGHT UPPER LIMB, INCLUDING SHOULDER: Status: ACTIVE | Noted: 2020-09-29

## 2020-09-29 PROBLEM — D22.72 MELANOCYTIC NEVI OF LEFT LOWER LIMB, INCLUDING HIP: Status: ACTIVE | Noted: 2020-09-29

## 2020-09-29 PROCEDURE — ? COUNSELING

## 2020-09-29 PROCEDURE — 99213 OFFICE O/P EST LOW 20 MIN: CPT

## 2020-09-29 PROCEDURE — ? ADDITIONAL NOTES

## 2020-09-29 RX ORDER — CLINDAMYCIN PHOSPHATE AND BENZOYL PEROXIDE 12; 50 MG/G; MG/G
GEL TOPICAL
Qty: 1 | Refills: 3 | Status: ERX | COMMUNITY
Start: 2020-09-29

## 2020-09-29 ASSESSMENT — LOCATION DETAILED DESCRIPTION DERM
LOCATION DETAILED: PERIUMBILICAL SKIN
LOCATION DETAILED: LEFT POPLITEAL SKIN
LOCATION DETAILED: RIGHT SUPERIOR MEDIAL MIDBACK
LOCATION DETAILED: LEFT POSTERIOR SHOULDER
LOCATION DETAILED: RIGHT ELBOW
LOCATION DETAILED: LEFT VENTRAL DISTAL FOREARM
LOCATION DETAILED: RIGHT VENTRAL PROXIMAL FOREARM
LOCATION DETAILED: RIGHT POSTERIOR SHOULDER
LOCATION DETAILED: RIGHT ANTECUBITAL SKIN
LOCATION DETAILED: RIGHT ANTERIOR PROXIMAL UPPER ARM
LOCATION DETAILED: RIGHT KNEE
LOCATION DETAILED: EPIGASTRIC SKIN
LOCATION DETAILED: LEFT ANTECUBITAL SKIN
LOCATION DETAILED: LEFT DISTAL POSTERIOR THIGH
LOCATION DETAILED: LEFT PROXIMAL DORSAL FOREARM
LOCATION DETAILED: LEFT DISTAL POSTERIOR UPPER ARM
LOCATION DETAILED: RIGHT INFERIOR MEDIAL UPPER BACK
LOCATION DETAILED: LEFT KNEE
LOCATION DETAILED: RIGHT RADIAL DORSAL HAND
LOCATION DETAILED: LEFT ANTERIOR DISTAL THIGH
LOCATION DETAILED: LEFT RADIAL DORSAL HAND
LOCATION DETAILED: RIGHT PROXIMAL DORSAL FOREARM
LOCATION DETAILED: RIGHT ANTERIOR DISTAL THIGH
LOCATION DETAILED: RIGHT POPLITEAL SKIN
LOCATION DETAILED: SUPERIOR LUMBAR SPINE
LOCATION DETAILED: RIGHT DISTAL POSTERIOR THIGH

## 2020-09-29 ASSESSMENT — LOCATION SIMPLE DESCRIPTION DERM
LOCATION SIMPLE: RIGHT UPPER BACK
LOCATION SIMPLE: LEFT THIGH
LOCATION SIMPLE: LEFT KNEE
LOCATION SIMPLE: RIGHT POPLITEAL SKIN
LOCATION SIMPLE: RIGHT SHOULDER
LOCATION SIMPLE: RIGHT UPPER ARM
LOCATION SIMPLE: RIGHT KNEE
LOCATION SIMPLE: RIGHT FOREARM
LOCATION SIMPLE: ABDOMEN
LOCATION SIMPLE: RIGHT ELBOW
LOCATION SIMPLE: LEFT FOREARM
LOCATION SIMPLE: LEFT SHOULDER
LOCATION SIMPLE: LEFT HAND
LOCATION SIMPLE: RIGHT POSTERIOR THIGH
LOCATION SIMPLE: RIGHT HAND
LOCATION SIMPLE: LEFT POSTERIOR THIGH
LOCATION SIMPLE: RIGHT LOWER BACK
LOCATION SIMPLE: LEFT UPPER ARM
LOCATION SIMPLE: LEFT POSTERIOR UPPER ARM
LOCATION SIMPLE: LOWER BACK
LOCATION SIMPLE: LEFT POPLITEAL SKIN
LOCATION SIMPLE: RIGHT THIGH

## 2020-09-29 ASSESSMENT — LOCATION ZONE DERM
LOCATION ZONE: HAND
LOCATION ZONE: ARM
LOCATION ZONE: TRUNK
LOCATION ZONE: LEG

## 2020-09-29 NOTE — PROCEDURE: ADDITIONAL NOTES
Detail Level: Simple
Additional Notes: Includes lesion of concern on upper right arm noted at intake.

## 2021-02-08 PROBLEM — R11.2 PONV (POSTOPERATIVE NAUSEA AND VOMITING): Chronic | Status: ACTIVE | Noted: 2021-02-08

## 2021-02-08 PROBLEM — Z98.890 PONV (POSTOPERATIVE NAUSEA AND VOMITING): Chronic | Status: ACTIVE | Noted: 2021-02-08

## 2021-09-30 ENCOUNTER — APPOINTMENT (RX ONLY)
Dept: URBAN - METROPOLITAN AREA CLINIC 31 | Facility: CLINIC | Age: 56
Setting detail: DERMATOLOGY
End: 2021-09-30

## 2021-09-30 DIAGNOSIS — D18.0 HEMANGIOMA: ICD-10-CM

## 2021-09-30 DIAGNOSIS — L29.8 OTHER PRURITUS: ICD-10-CM

## 2021-09-30 DIAGNOSIS — L64.8 OTHER ANDROGENIC ALOPECIA: ICD-10-CM

## 2021-09-30 DIAGNOSIS — L29.89 OTHER PRURITUS: ICD-10-CM

## 2021-09-30 DIAGNOSIS — L81.4 OTHER MELANIN HYPERPIGMENTATION: ICD-10-CM

## 2021-09-30 DIAGNOSIS — L82.1 OTHER SEBORRHEIC KERATOSIS: ICD-10-CM

## 2021-09-30 DIAGNOSIS — Z71.89 OTHER SPECIFIED COUNSELING: ICD-10-CM

## 2021-09-30 DIAGNOSIS — D22 MELANOCYTIC NEVI: ICD-10-CM

## 2021-09-30 PROBLEM — D18.01 HEMANGIOMA OF SKIN AND SUBCUTANEOUS TISSUE: Status: ACTIVE | Noted: 2021-09-30

## 2021-09-30 PROBLEM — D22.9 MELANOCYTIC NEVI, UNSPECIFIED: Status: ACTIVE | Noted: 2021-09-30

## 2021-09-30 PROCEDURE — ? PRESCRIPTION

## 2021-09-30 PROCEDURE — ? COUNSELING

## 2021-09-30 PROCEDURE — ? ADDITIONAL NOTES

## 2021-09-30 PROCEDURE — 99213 OFFICE O/P EST LOW 20 MIN: CPT

## 2021-09-30 RX ORDER — MINOXIDIL 2 %
1G SOLUTION, NON-ORAL TOPICAL DAILY
Qty: 60 | Refills: 11 | Status: ERX | COMMUNITY
Start: 2021-09-30

## 2021-09-30 RX ADMIN — Medication 1G: at 00:00

## 2021-09-30 ASSESSMENT — LOCATION SIMPLE DESCRIPTION DERM
LOCATION SIMPLE: RIGHT SCALP
LOCATION SIMPLE: GROIN

## 2021-09-30 ASSESSMENT — LOCATION ZONE DERM
LOCATION ZONE: SCALP
LOCATION ZONE: TRUNK

## 2021-09-30 ASSESSMENT — LOCATION DETAILED DESCRIPTION DERM
LOCATION DETAILED: RIGHT MEDIAL FRONTAL SCALP
LOCATION DETAILED: LEFT SUPRAPUBIC SKIN

## 2021-09-30 NOTE — PROCEDURE: COUNSELING
Detail Level: Zone
Detail Level: Detailed
Patient Specific Counseling (Will Not Stick From Patient To Patient): Patient reports that she recently had labs done with her primary including iron, ferritin and TSH and were all normal - per the patient.\\n\\Renuka discussed [employer]. Explained that it is a private company and doesn’t have medical studies but does have good patient results.
Patient Specific Counseling (Will Not Stick From Patient To Patient): Patient reported that there was a lesion there but it has resolved. Thinks it was a bite. Nothing on exam today and pruritus has resolved.

## 2021-09-30 NOTE — PROCEDURE: MIPS QUALITY
Quality 110: Preventive Care And Screening: Influenza Immunization: Influenza Immunization not Administered for Documented Reasons.
Quality 226: Preventive Care And Screening: Tobacco Use: Screening And Cessation Intervention: Patient screened for tobacco use and is an ex/non-smoker
Detail Level: Detailed
Quality 130: Documentation Of Current Medications In The Medical Record: Current Medications Documented
Quality 111:Pneumonia Vaccination Status For Older Adults: Pneumococcal Vaccination not Administered or Previously Received, Reason not Otherwise Specified
Quality 431: Preventive Care And Screening: Unhealthy Alcohol Use - Screening: Patient not identified as an unhealthy alcohol user when screened for unhealthy alcohol use using a systematic screening method

## 2023-05-01 ENCOUNTER — ANESTHESIA (OUTPATIENT)
Dept: SURGERY | Facility: MEDICAL CENTER | Age: 58
End: 2023-05-01
Payer: COMMERCIAL

## 2023-05-01 ENCOUNTER — ANESTHESIA EVENT (OUTPATIENT)
Dept: SURGERY | Facility: MEDICAL CENTER | Age: 58
End: 2023-05-01
Payer: COMMERCIAL

## 2023-05-01 ENCOUNTER — HOSPITAL ENCOUNTER (OUTPATIENT)
Facility: MEDICAL CENTER | Age: 58
End: 2023-05-01
Attending: INTERNAL MEDICINE | Admitting: INTERNAL MEDICINE
Payer: COMMERCIAL

## 2023-05-01 VITALS
TEMPERATURE: 96.8 F | SYSTOLIC BLOOD PRESSURE: 147 MMHG | WEIGHT: 154.43 LBS | HEART RATE: 84 BPM | OXYGEN SATURATION: 94 % | RESPIRATION RATE: 16 BRPM | DIASTOLIC BLOOD PRESSURE: 82 MMHG | HEIGHT: 64 IN | BODY MASS INDEX: 26.37 KG/M2

## 2023-05-01 PROBLEM — K29.70 HELICOBACTER PYLORI GASTRITIS: Status: ACTIVE | Noted: 2023-05-01

## 2023-05-01 PROBLEM — B96.81 HELICOBACTER PYLORI GASTRITIS: Status: ACTIVE | Noted: 2023-05-01

## 2023-05-01 LAB
ANION GAP SERPL CALC-SCNC: 12 MMOL/L (ref 7–16)
BUN SERPL-MCNC: 13 MG/DL (ref 8–22)
CALCIUM SERPL-MCNC: 8.9 MG/DL (ref 8.4–10.2)
CHLORIDE SERPL-SCNC: 106 MMOL/L (ref 96–112)
CO2 SERPL-SCNC: 23 MMOL/L (ref 20–33)
CREAT SERPL-MCNC: 0.51 MG/DL (ref 0.5–1.4)
EKG IMPRESSION: NORMAL
GFR SERPLBLD CREATININE-BSD FMLA CKD-EPI: 109 ML/MIN/1.73 M 2
GLUCOSE SERPL-MCNC: 126 MG/DL (ref 65–99)
GRAM STN SPEC: NORMAL
PATHOLOGY CONSULT NOTE: NORMAL
POTASSIUM SERPL-SCNC: 3.6 MMOL/L (ref 3.6–5.5)
SIGNIFICANT IND 70042: NORMAL
SITE SITE: NORMAL
SODIUM SERPL-SCNC: 141 MMOL/L (ref 135–145)
SOURCE SOURCE: NORMAL

## 2023-05-01 PROCEDURE — 700111 HCHG RX REV CODE 636 W/ 250 OVERRIDE (IP): Performed by: ANESTHESIOLOGY

## 2023-05-01 PROCEDURE — 36415 COLL VENOUS BLD VENIPUNCTURE: CPT

## 2023-05-01 PROCEDURE — 160048 HCHG OR STATISTICAL LEVEL 1-5: Performed by: INTERNAL MEDICINE

## 2023-05-01 PROCEDURE — 87070 CULTURE OTHR SPECIMN AEROBIC: CPT

## 2023-05-01 PROCEDURE — 700105 HCHG RX REV CODE 258: Performed by: ANESTHESIOLOGY

## 2023-05-01 PROCEDURE — 87205 SMEAR GRAM STAIN: CPT

## 2023-05-01 PROCEDURE — 160035 HCHG PACU - 1ST 60 MINS PHASE I: Performed by: INTERNAL MEDICINE

## 2023-05-01 PROCEDURE — 160025 RECOVERY II MINUTES (STATS): Performed by: INTERNAL MEDICINE

## 2023-05-01 PROCEDURE — 93010 ELECTROCARDIOGRAM REPORT: CPT | Performed by: INTERNAL MEDICINE

## 2023-05-01 PROCEDURE — 88305 TISSUE EXAM BY PATHOLOGIST: CPT

## 2023-05-01 PROCEDURE — 87075 CULTR BACTERIA EXCEPT BLOOD: CPT

## 2023-05-01 PROCEDURE — 160036 HCHG PACU - EA ADDL 30 MINS PHASE I: Performed by: INTERNAL MEDICINE

## 2023-05-01 PROCEDURE — 80048 BASIC METABOLIC PNL TOTAL CA: CPT

## 2023-05-01 PROCEDURE — 160002 HCHG RECOVERY MINUTES (STAT): Performed by: INTERNAL MEDICINE

## 2023-05-01 PROCEDURE — 87077 CULTURE AEROBIC IDENTIFY: CPT

## 2023-05-01 PROCEDURE — 93005 ELECTROCARDIOGRAM TRACING: CPT | Performed by: ANESTHESIOLOGY

## 2023-05-01 PROCEDURE — 00731 ANES UPR GI NDSC PX NOS: CPT | Performed by: ANESTHESIOLOGY

## 2023-05-01 PROCEDURE — 160009 HCHG ANES TIME/MIN: Performed by: INTERNAL MEDICINE

## 2023-05-01 PROCEDURE — 160046 HCHG PACU - 1ST 60 MINS PHASE II: Performed by: INTERNAL MEDICINE

## 2023-05-01 PROCEDURE — 700101 HCHG RX REV CODE 250: Performed by: ANESTHESIOLOGY

## 2023-05-01 PROCEDURE — 87015 SPECIMEN INFECT AGNT CONCNTJ: CPT

## 2023-05-01 PROCEDURE — 160202 HCHG ENDO MINUTES - 1ST 30 MINS LEVEL 3: Performed by: INTERNAL MEDICINE

## 2023-05-01 PROCEDURE — 88312 SPECIAL STAINS GROUP 1: CPT

## 2023-05-01 RX ORDER — MEPERIDINE HYDROCHLORIDE 25 MG/ML
12.5 INJECTION INTRAMUSCULAR; INTRAVENOUS; SUBCUTANEOUS
Status: DISCONTINUED | OUTPATIENT
Start: 2023-05-01 | End: 2023-05-01 | Stop reason: HOSPADM

## 2023-05-01 RX ORDER — HALOPERIDOL 5 MG/ML
1 INJECTION INTRAMUSCULAR
Status: DISCONTINUED | OUTPATIENT
Start: 2023-05-01 | End: 2023-05-01 | Stop reason: HOSPADM

## 2023-05-01 RX ORDER — LABETALOL HYDROCHLORIDE 5 MG/ML
10 INJECTION, SOLUTION INTRAVENOUS ONCE
Status: COMPLETED | OUTPATIENT
Start: 2023-05-01 | End: 2023-05-01

## 2023-05-01 RX ORDER — DIPHENHYDRAMINE HYDROCHLORIDE 50 MG/ML
12.5 INJECTION INTRAMUSCULAR; INTRAVENOUS
Status: DISCONTINUED | OUTPATIENT
Start: 2023-05-01 | End: 2023-05-01 | Stop reason: HOSPADM

## 2023-05-01 RX ORDER — HYDRALAZINE HYDROCHLORIDE 20 MG/ML
5 INJECTION INTRAMUSCULAR; INTRAVENOUS
Status: DISCONTINUED | OUTPATIENT
Start: 2023-05-01 | End: 2023-05-01 | Stop reason: HOSPADM

## 2023-05-01 RX ORDER — ONDANSETRON 2 MG/ML
4 INJECTION INTRAMUSCULAR; INTRAVENOUS
Status: COMPLETED | OUTPATIENT
Start: 2023-05-01 | End: 2023-05-01

## 2023-05-01 RX ORDER — HYDROMORPHONE HYDROCHLORIDE 1 MG/ML
0.2 INJECTION, SOLUTION INTRAMUSCULAR; INTRAVENOUS; SUBCUTANEOUS
Status: DISCONTINUED | OUTPATIENT
Start: 2023-05-01 | End: 2023-05-01 | Stop reason: HOSPADM

## 2023-05-01 RX ORDER — OXYCODONE HCL 5 MG/5 ML
5 SOLUTION, ORAL ORAL
Status: DISCONTINUED | OUTPATIENT
Start: 2023-05-01 | End: 2023-05-01 | Stop reason: HOSPADM

## 2023-05-01 RX ORDER — HYDROMORPHONE HYDROCHLORIDE 1 MG/ML
0.4 INJECTION, SOLUTION INTRAMUSCULAR; INTRAVENOUS; SUBCUTANEOUS
Status: DISCONTINUED | OUTPATIENT
Start: 2023-05-01 | End: 2023-05-01 | Stop reason: HOSPADM

## 2023-05-01 RX ORDER — SODIUM CHLORIDE, SODIUM LACTATE, POTASSIUM CHLORIDE, CALCIUM CHLORIDE 600; 310; 30; 20 MG/100ML; MG/100ML; MG/100ML; MG/100ML
INJECTION, SOLUTION INTRAVENOUS
Status: DISCONTINUED | OUTPATIENT
Start: 2023-05-01 | End: 2023-05-01 | Stop reason: SURG

## 2023-05-01 RX ORDER — HYDROMORPHONE HYDROCHLORIDE 1 MG/ML
0.1 INJECTION, SOLUTION INTRAMUSCULAR; INTRAVENOUS; SUBCUTANEOUS
Status: DISCONTINUED | OUTPATIENT
Start: 2023-05-01 | End: 2023-05-01 | Stop reason: HOSPADM

## 2023-05-01 RX ORDER — OXYCODONE HCL 5 MG/5 ML
10 SOLUTION, ORAL ORAL
Status: DISCONTINUED | OUTPATIENT
Start: 2023-05-01 | End: 2023-05-01 | Stop reason: HOSPADM

## 2023-05-01 RX ORDER — METOPROLOL TARTRATE 1 MG/ML
1 INJECTION, SOLUTION INTRAVENOUS
Status: DISCONTINUED | OUTPATIENT
Start: 2023-05-01 | End: 2023-05-01 | Stop reason: HOSPADM

## 2023-05-01 RX ORDER — SODIUM CHLORIDE, SODIUM LACTATE, POTASSIUM CHLORIDE, CALCIUM CHLORIDE 600; 310; 30; 20 MG/100ML; MG/100ML; MG/100ML; MG/100ML
INJECTION, SOLUTION INTRAVENOUS CONTINUOUS
Status: DISCONTINUED | OUTPATIENT
Start: 2023-05-01 | End: 2023-05-01 | Stop reason: HOSPADM

## 2023-05-01 RX ORDER — MIDAZOLAM HYDROCHLORIDE 1 MG/ML
INJECTION INTRAMUSCULAR; INTRAVENOUS PRN
Status: DISCONTINUED | OUTPATIENT
Start: 2023-05-01 | End: 2023-05-01 | Stop reason: SURG

## 2023-05-01 RX ORDER — EPHEDRINE SULFATE 50 MG/ML
5 INJECTION, SOLUTION INTRAVENOUS
Status: DISCONTINUED | OUTPATIENT
Start: 2023-05-01 | End: 2023-05-01 | Stop reason: HOSPADM

## 2023-05-01 RX ORDER — LIDOCAINE HYDROCHLORIDE 20 MG/ML
INJECTION, SOLUTION EPIDURAL; INFILTRATION; INTRACAUDAL; PERINEURAL PRN
Status: DISCONTINUED | OUTPATIENT
Start: 2023-05-01 | End: 2023-05-01 | Stop reason: SURG

## 2023-05-01 RX ORDER — MIDAZOLAM HYDROCHLORIDE 1 MG/ML
1 INJECTION INTRAMUSCULAR; INTRAVENOUS
Status: DISCONTINUED | OUTPATIENT
Start: 2023-05-01 | End: 2023-05-01 | Stop reason: HOSPADM

## 2023-05-01 RX ADMIN — SODIUM CHLORIDE, POTASSIUM CHLORIDE, SODIUM LACTATE AND CALCIUM CHLORIDE: 600; 310; 30; 20 INJECTION, SOLUTION INTRAVENOUS at 07:27

## 2023-05-01 RX ADMIN — ONDANSETRON 4 MG: 2 INJECTION INTRAMUSCULAR; INTRAVENOUS at 09:15

## 2023-05-01 RX ADMIN — PROPOFOL 50 MG: 10 INJECTION, EMULSION INTRAVENOUS at 07:33

## 2023-05-01 RX ADMIN — HYDRALAZINE HYDROCHLORIDE 5 MG: 20 INJECTION INTRAMUSCULAR; INTRAVENOUS at 08:19

## 2023-05-01 RX ADMIN — FENTANYL CITRATE 50 MCG: 50 INJECTION, SOLUTION INTRAMUSCULAR; INTRAVENOUS at 07:33

## 2023-05-01 RX ADMIN — FENTANYL CITRATE 50 MCG: 50 INJECTION, SOLUTION INTRAMUSCULAR; INTRAVENOUS at 07:32

## 2023-05-01 RX ADMIN — LABETALOL HYDROCHLORIDE 10 MG: 5 INJECTION INTRAVENOUS at 09:19

## 2023-05-01 RX ADMIN — PROPOFOL 50 MG: 10 INJECTION, EMULSION INTRAVENOUS at 07:32

## 2023-05-01 RX ADMIN — HYDRALAZINE HYDROCHLORIDE 5 MG: 20 INJECTION INTRAMUSCULAR; INTRAVENOUS at 08:40

## 2023-05-01 RX ADMIN — HYDRALAZINE HYDROCHLORIDE 5 MG: 20 INJECTION INTRAMUSCULAR; INTRAVENOUS at 08:27

## 2023-05-01 RX ADMIN — MIDAZOLAM HYDROCHLORIDE 2 MG: 1 INJECTION, SOLUTION INTRAMUSCULAR; INTRAVENOUS at 07:30

## 2023-05-01 RX ADMIN — LIDOCAINE HYDROCHLORIDE 70 MG: 20 INJECTION, SOLUTION EPIDURAL; INFILTRATION; INTRACAUDAL at 07:32

## 2023-05-01 ASSESSMENT — PAIN SCALES - GENERAL: PAIN_LEVEL: 0

## 2023-05-01 ASSESSMENT — FIBROSIS 4 INDEX: FIB4 SCORE: 0.82

## 2023-05-01 NOTE — ANESTHESIA TIME REPORT
Anesthesia Start and Stop Event Times     Date Time Event    5/1/2023 0712 Ready for Procedure     0727 Anesthesia Start     0744 Anesthesia Stop        Responsible Staff  05/01/23    Name Role Begin End    Jakub Villarreal D.O. Anesth 0727 0744        Overtime Reason:  no overtime (within assigned shift)    Comments:

## 2023-05-01 NOTE — OR NURSING
0805 : Report received from MARIBEL Nice. Patient rousable, respirations are spontaneous and unlabored. VSS on 6L, patient weaned to room air. Patient denies pain and nausea.     0815: Patient remains hypertensive. Requesting pharmacy to approve medications for patient. Patient's  updated.     0825: BP re-assessed, remains hypertensive, medicated per mar.     0830: Patient tolerating PO fluids.     0840: Patient remains hypertensive, medicated per mar. MD Villarreal aware at bedside and aware of BP. Verbal order placed for BP range upon discharge.     0845: No changes.     0900: Patient denies pain and nausea.     0915: MD Villarreal to bedside, orders placed. Patient complains of nausea, medicated per mar.     0925: Patient states she has pressure to her chest. MD Villarreal aware, EKG order placed.     0930: EKG in progress.     0935: MD Villarreal at bedside to review. Per MD, patient may discharge. Meets criteria to transfer to Stage 2.     0945: No changes. Report to MARIBEL Davis.

## 2023-05-01 NOTE — ANESTHESIA POSTPROCEDURE EVALUATION
Patient: Susan Sweeney    Procedure Summary     Date: 05/01/23 Room / Location:  ENDOSCOPIC ULTRASOUND ROOM / SURGERY [facility]    Anesthesia Start: 0727 Anesthesia Stop: 0744    Procedure: ESOPHAGOGASTRODUODENOSCOPY Diagnosis:       Helicobacter pylori (H. pylori)      (Helicobacter pylori (H. pylori) [041.86.ICD-9-CM])    Surgeons: Mian Fang M.D. Responsible Provider: Jakub Villarreal D.O.    Anesthesia Type: MAC ASA Status: 2          Final Anesthesia Type: MAC  Last vitals  BP   Blood Pressure: (!) 177/76    Temp   36.2 °C (97.2 °F)    Pulse   84   Resp   16    SpO2   97 %      Anesthesia Post Evaluation    Patient location during evaluation: PACU  Patient participation: complete - patient participated  Level of consciousness: awake and alert  Pain score: 0    Airway patency: patent  Anesthetic complications: no  Cardiovascular status: hemodynamically stable  Respiratory status: acceptable  Hydration status: euvolemic    PONV: none          No notable events documented.     Nurse Pain Score: 0 (NPRS)

## 2023-05-01 NOTE — OR SURGEON
Immediate Post OP Note    PreOp Diagnosis: H pylori gastritis, recurrent      PostOp Diagnosis: Gastritis, duodenitis      Procedure(s):  ESOPHAGOGASTRODUODENOSCOPY with biopsies - Wound Class: Clean Contaminated    Surgeon(s):  Mian Fang M.D.    Anesthesiologist/Type of Anesthesia:  Anesthesiologist: Jakub Villarreal D.O./MAC    Surgical Staff:  Circulator: Aravind Paz R.N.  Endoscopy Technician: Alley Bell; Tavia Schmidt    Specimens removed if any:  ID Type Source Tests Collected by Time Destination   A :  Tissue Duodenum PATHOLOGY SPECIMEN Mian Fang M.D. 5/1/2023  7:34 AM    B :  Tissue Gastric PATHOLOGY SPECIMEN Mian Fang M.D. 5/1/2023  7:35 AM    C : H. Pylori Tissue Gastric PATHOLOGY SPECIMEN, AEROBIC/ANAEROBIC CULTURE (SURGERY) Mian Fang M.D. 5/1/2023  7:35 AM        Estimated Blood Loss: None    Findings:   Subepithelial erythema fo fundus and body of stomach and few scattered erosions antrum  Duodenal bulb erythema    Complications: None        5/1/2023 7:39 AM Mian Fang M.D.

## 2023-05-01 NOTE — DISCHARGE INSTRUCTIONS
ENDOSCOPY HOME CARE INSTRUCTIONS    GASTROSCOPY OR ERCP  1. Don't eat or drink anything for about an hour after the test. You can then resume your regular diet.  2. Don't drive or drink alcohol for 24 hours. The medication you received will make you too drowsy.  3. Don't take any coffee, tea, or aspirin products until after you see your doctor. These can harm the lining of your stomach.  4. If you begin to vomit bloody material, or develop black or bloody stools, call your doctor as soon as possible.  5. If you have any neck, chest, abdominal pain or temp of 100 degrees, call your doctor.  6. Additional instructions:     Findings:   Subepithelial erythema fo fundus and body of stomach and few scattered erosions antrum  Duodenal bulb erythema    You should call 911 if you develop problems with breathing or chest pain.  If any questions arise, call your doctor. If your doctor is not available, please feel free to call [phone]. You can also call the [employer] open 24 hours/day, 7 days/week and speak to a nurse at [phone], or toll free [phone].    What to Expect Post Anesthesia    Rest and take it easy for the first 24 hours.  A responsible adult is recommended to remain with you during that time.  It is normal to feel sleepy.  We encourage you to not do anything that requires balance, judgment or coordination.    FOR 24 HOURS DO NOT:  Drive, operate machinery or run household appliances.  Drink beer or alcoholic beverages.  Make important decisions or sign legal documents.    To avoid nausea, slowly advance diet as tolerated, avoiding spicy or greasy foods for the first day.  Add more substantial food to your diet according to your provider's instructions. INCREASE FLUIDS AND FIBER TO AVOID CONSTIPATION.    MILD FLU-LIKE SYMPTOMS ARE NORMAL.  YOU MAY EXPERIENCE GENERALIZED MUSCLE ACHES, THROAT IRRITATION, HEADACHE AND/OR SOME NAUSEA.

## 2023-05-01 NOTE — OR NURSING
0740 Pt arrived from Endo, report received from anesthesiologist and RN. Pt sedated at this time. respirations spontaneous and unlabored.      0755 Pt more awake, denies pain and nausea. Dr. Fang at bedside.     0805 Report to Omid LÓPEZ.

## 2023-05-01 NOTE — OR NURSING
Procedure, patient allergies, and NPO status verified. Home med rec completed and belongings secured. Patient verbalizes understanding of pain scale, expected course of stay, and plan of care. Surgical site verified with pt, IV access established.    Dr. Villarreal notified of pt BP.

## 2023-05-01 NOTE — OR NURSING
0945: To stage ll. No pain or nausea.    1011: Home care instructions reviewed w/ pt and . No questions. Meets criteria for discharge.

## 2023-05-01 NOTE — ANESTHESIA PREPROCEDURE EVALUATION
Case: [id number] Date/Time: 05/01/23 0715    Procedure: ESOPHAGOGASTRODUODENOSCOPY    Anesthesia type: MAC    Pre-op diagnosis: H PYLORI +    Location:  ENDOSCOPIC ULTRASOUND ROOM / SURGERY [facility]    Surgeons: Mian Fang M.D.          Relevant Problems   ANESTHESIA   (positive) PONV (postoperative nausea and vomiting)       Physical Exam    Airway   Mallampati: II  TM distance: >3 FB  Neck ROM: full       Cardiovascular - normal exam  Rhythm: regular  Rate: normal  (-) murmur     Dental - normal exam           Pulmonary - normal exam  Breath sounds clear to auscultation     Abdominal    Neurological - normal exam                 Anesthesia Plan    ASA 2       Plan - MAC               Induction: intravenous      Pertinent diagnostic labs and testing reviewed    Informed Consent:    Anesthetic plan and risks discussed with patient.    Use of blood products discussed with: patient whom consented to blood products.

## 2023-05-01 NOTE — PROCEDURES
DATE OF PROCEDURE:  05/01/2023     PROCEDURE:  Upper endoscopy with biopsies.     :  Mian Fang MD     INDICATIONS:  The patient is a 57-year-old female with history of recurrent H.   pylori gastritis, who has failed 3 different courses of outpatient   antibiotics for H. pylori, who presents for additional biopsies for culture   and sensitivity of H. pylori.  She does have persistent epigastric pain and   upper abdominal bloating.     INFORMED CONSENT:  Written informed consent was obtained from the patient   after thorough explanation of indications, benefits, and risks of the   procedure, which included but was not limited to bleeding, infection,   perforation, adverse reaction to medications and missed lesions.     MEDICATIONS GIVEN:  Monitored anesthesia care with propofol.     Continuous telemetry, BP, pulse oximetry, and capnography were performed by   the anesthesiologist throughout the procedure.     Midsize flexible upper endoscope was used for the procedure.     FINDING:  The patient was placed in left lateral decubitus position.  After   anesthesia was achieved, the endoscope was passed in the posterior pharynx   under direct visualization, advanced to the second portion of duodenum without   difficulty.  The patient tolerated the procedure well with following   findings:  1.  Esophagus:  Normal.  2.  Stomach:  She did have scattered subepithelial erythema within the gastric   fundus and body consistent with suspected persistent H. pylori gastritis.    She did have a few scattered erosions within the gastric antrum.  Biopsies   were obtained of the stomach and placed in formalin for pathology as well as   in sterile water for microbiology for culture and sensitivity.  3.  Duodenum:  Duodenal bulb erythema.     IMPRESSION:   1.  Gastritis, suspected persistent H. pylori infection.  Biopsies obtained,   both for pathology and microbiology for culture and sensitivity.  2.  Mild nonerosive  duodenitis.     PLAN:   1.  Await biopsies.  2.  Follow up in GI clinic as previously scheduled.        ______________________________  MD RENNY SHARIF/EMMIE    DD:  05/01/2023 07:44  DT:  05/01/2023 08:13    Job#:  [id number]

## 2023-05-02 NOTE — H&P
ADDENDUM to APRN H&P:    Agree with H&P performed by CORBIN Arriola from 4/19/2023 with no updates or edits.  Patient with recurrent H pylori gastritis despite antibiotics that needs EGD for gastric biopsies and culture and sensitivity of H pylori.

## 2023-05-03 LAB
BACTERIA TISS AEROBE CULT: ABNORMAL
GRAM STN SPEC: ABNORMAL
SIGNIFICANT IND 70042: ABNORMAL
SITE SITE: ABNORMAL
SOURCE SOURCE: ABNORMAL

## 2023-05-05 LAB
BACTERIA SPEC ANAEROBE CULT: NORMAL
SIGNIFICANT IND 70042: NORMAL
SITE SITE: NORMAL
SOURCE SOURCE: NORMAL

## 2023-06-13 ENCOUNTER — APPOINTMENT (OUTPATIENT)
Dept: ADMISSIONS | Facility: MEDICAL CENTER | Age: 58
End: 2023-06-13
Attending: INTERNAL MEDICINE
Payer: COMMERCIAL

## 2023-06-14 ENCOUNTER — PRE-ADMISSION TESTING (OUTPATIENT)
Dept: ADMISSIONS | Facility: MEDICAL CENTER | Age: 58
End: 2023-06-14
Attending: INTERNAL MEDICINE
Payer: COMMERCIAL

## 2023-06-14 VITALS — BODY MASS INDEX: 26.51 KG/M2 | HEIGHT: 64 IN

## 2023-06-14 NOTE — PREPROCEDURE INSTRUCTIONS
Pre admit apt: Pt. Instructed to continue regularly prescribed medications through day before surgery.  Instructed to take the following medications, the day of surgery, with a sip of water per anesthesia protocol: xanax, nasal spray, omeprazole  METS-greater than 4  No previous issues with anesthesia  Instructed pt to notify Dr. Fang, if she develops any new sxs of illness/covid, prior to surgery.

## 2023-06-28 ENCOUNTER — ANESTHESIA EVENT (OUTPATIENT)
Dept: SURGERY | Facility: MEDICAL CENTER | Age: 58
End: 2023-06-28
Payer: COMMERCIAL

## 2023-06-28 ENCOUNTER — HOSPITAL ENCOUNTER (OUTPATIENT)
Facility: MEDICAL CENTER | Age: 58
End: 2023-06-28
Attending: INTERNAL MEDICINE | Admitting: INTERNAL MEDICINE
Payer: COMMERCIAL

## 2023-06-28 ENCOUNTER — ANESTHESIA (OUTPATIENT)
Dept: SURGERY | Facility: MEDICAL CENTER | Age: 58
End: 2023-06-28
Payer: COMMERCIAL

## 2023-06-28 VITALS
BODY MASS INDEX: 26.35 KG/M2 | RESPIRATION RATE: 16 BRPM | DIASTOLIC BLOOD PRESSURE: 95 MMHG | SYSTOLIC BLOOD PRESSURE: 150 MMHG | HEIGHT: 64 IN | TEMPERATURE: 97.1 F | OXYGEN SATURATION: 98 % | HEART RATE: 99 BPM | WEIGHT: 154.32 LBS

## 2023-06-28 LAB — TROPONIN T SERPL-MCNC: <6 NG/L (ref 6–19)

## 2023-06-28 PROCEDURE — 160046 HCHG PACU - 1ST 60 MINS PHASE II: Performed by: INTERNAL MEDICINE

## 2023-06-28 PROCEDURE — 36415 COLL VENOUS BLD VENIPUNCTURE: CPT

## 2023-06-28 PROCEDURE — 160002 HCHG RECOVERY MINUTES (STAT): Performed by: INTERNAL MEDICINE

## 2023-06-28 PROCEDURE — 700101 HCHG RX REV CODE 250: Performed by: ANESTHESIOLOGY

## 2023-06-28 PROCEDURE — 88305 TISSUE EXAM BY PATHOLOGIST: CPT

## 2023-06-28 PROCEDURE — 160009 HCHG ANES TIME/MIN: Performed by: INTERNAL MEDICINE

## 2023-06-28 PROCEDURE — 700111 HCHG RX REV CODE 636 W/ 250 OVERRIDE (IP): Mod: JZ | Performed by: ANESTHESIOLOGY

## 2023-06-28 PROCEDURE — 369999 HCHG MISC LAB CHARGE

## 2023-06-28 PROCEDURE — 93005 ELECTROCARDIOGRAM TRACING: CPT | Performed by: INTERNAL MEDICINE

## 2023-06-28 PROCEDURE — 88312 SPECIAL STAINS GROUP 1: CPT

## 2023-06-28 PROCEDURE — 700105 HCHG RX REV CODE 258: Mod: JZ | Performed by: INTERNAL MEDICINE

## 2023-06-28 PROCEDURE — 87176 TISSUE HOMOGENIZATION CULTR: CPT

## 2023-06-28 PROCEDURE — 160025 RECOVERY II MINUTES (STATS): Performed by: INTERNAL MEDICINE

## 2023-06-28 PROCEDURE — 160035 HCHG PACU - 1ST 60 MINS PHASE I: Performed by: INTERNAL MEDICINE

## 2023-06-28 PROCEDURE — 00731 ANES UPR GI NDSC PX NOS: CPT | Performed by: ANESTHESIOLOGY

## 2023-06-28 PROCEDURE — 87070 CULTURE OTHR SPECIMN AEROBIC: CPT

## 2023-06-28 PROCEDURE — 160202 HCHG ENDO MINUTES - 1ST 30 MINS LEVEL 3: Performed by: INTERNAL MEDICINE

## 2023-06-28 PROCEDURE — 160048 HCHG OR STATISTICAL LEVEL 1-5: Performed by: INTERNAL MEDICINE

## 2023-06-28 PROCEDURE — 84484 ASSAY OF TROPONIN QUANT: CPT

## 2023-06-28 RX ORDER — HALOPERIDOL 5 MG/ML
1 INJECTION INTRAMUSCULAR
Status: DISCONTINUED | OUTPATIENT
Start: 2023-06-28 | End: 2023-06-28 | Stop reason: HOSPADM

## 2023-06-28 RX ORDER — ONDANSETRON 2 MG/ML
4 INJECTION INTRAMUSCULAR; INTRAVENOUS
Status: DISCONTINUED | OUTPATIENT
Start: 2023-06-28 | End: 2023-06-28 | Stop reason: HOSPADM

## 2023-06-28 RX ORDER — SODIUM CHLORIDE, SODIUM LACTATE, POTASSIUM CHLORIDE, CALCIUM CHLORIDE 600; 310; 30; 20 MG/100ML; MG/100ML; MG/100ML; MG/100ML
INJECTION, SOLUTION INTRAVENOUS CONTINUOUS
Status: DISCONTINUED | OUTPATIENT
Start: 2023-06-28 | End: 2023-06-28 | Stop reason: HOSPADM

## 2023-06-28 RX ORDER — MEPERIDINE HYDROCHLORIDE 25 MG/ML
6.25 INJECTION INTRAMUSCULAR; INTRAVENOUS; SUBCUTANEOUS
Status: DISCONTINUED | OUTPATIENT
Start: 2023-06-28 | End: 2023-06-28 | Stop reason: HOSPADM

## 2023-06-28 RX ORDER — LIDOCAINE HYDROCHLORIDE 20 MG/ML
INJECTION, SOLUTION EPIDURAL; INFILTRATION; INTRACAUDAL; PERINEURAL PRN
Status: DISCONTINUED | OUTPATIENT
Start: 2023-06-28 | End: 2023-06-28 | Stop reason: SURG

## 2023-06-28 RX ORDER — OXYCODONE HCL 5 MG/5 ML
10 SOLUTION, ORAL ORAL
Status: DISCONTINUED | OUTPATIENT
Start: 2023-06-28 | End: 2023-06-28 | Stop reason: HOSPADM

## 2023-06-28 RX ORDER — HYDRALAZINE HYDROCHLORIDE 20 MG/ML
INJECTION INTRAMUSCULAR; INTRAVENOUS PRN
Status: DISCONTINUED | OUTPATIENT
Start: 2023-06-28 | End: 2023-06-28 | Stop reason: SURG

## 2023-06-28 RX ORDER — DIPHENHYDRAMINE HYDROCHLORIDE 50 MG/ML
12.5 INJECTION INTRAMUSCULAR; INTRAVENOUS
Status: DISCONTINUED | OUTPATIENT
Start: 2023-06-28 | End: 2023-06-28 | Stop reason: HOSPADM

## 2023-06-28 RX ORDER — OXYCODONE HCL 5 MG/5 ML
5 SOLUTION, ORAL ORAL
Status: DISCONTINUED | OUTPATIENT
Start: 2023-06-28 | End: 2023-06-28 | Stop reason: HOSPADM

## 2023-06-28 RX ADMIN — SODIUM CHLORIDE, POTASSIUM CHLORIDE, SODIUM LACTATE AND CALCIUM CHLORIDE: 600; 310; 30; 20 INJECTION, SOLUTION INTRAVENOUS at 14:54

## 2023-06-28 RX ADMIN — HYDRALAZINE HYDROCHLORIDE 10 MG: 20 INJECTION, SOLUTION INTRAMUSCULAR; INTRAVENOUS at 14:57

## 2023-06-28 RX ADMIN — PROPOFOL 50 MG: 10 INJECTION, EMULSION INTRAVENOUS at 15:04

## 2023-06-28 RX ADMIN — FENTANYL CITRATE 50 MCG: 50 INJECTION, SOLUTION INTRAMUSCULAR; INTRAVENOUS at 14:57

## 2023-06-28 RX ADMIN — PROPOFOL 50 MG: 10 INJECTION, EMULSION INTRAVENOUS at 15:00

## 2023-06-28 RX ADMIN — MIDAZOLAM 2 MG: 1 INJECTION, SOLUTION INTRAMUSCULAR; INTRAVENOUS at 14:54

## 2023-06-28 RX ADMIN — PROPOFOL 50 MG: 10 INJECTION, EMULSION INTRAVENOUS at 14:57

## 2023-06-28 RX ADMIN — LIDOCAINE HYDROCHLORIDE 20 MG: 20 INJECTION, SOLUTION EPIDURAL; INFILTRATION; INTRACAUDAL at 14:57

## 2023-06-28 ASSESSMENT — FIBROSIS 4 INDEX: FIB4 SCORE: 0.82

## 2023-06-28 ASSESSMENT — PAIN DESCRIPTION - PAIN TYPE: TYPE: CHRONIC PAIN

## 2023-06-28 ASSESSMENT — PAIN SCALES - GENERAL: PAIN_LEVEL: 0

## 2023-06-28 NOTE — ANESTHESIA TIME REPORT
Anesthesia Start and Stop Event Times     Date Time Event    6/28/2023 1444 Ready for Procedure     1454 Anesthesia Start     1511 Anesthesia Stop        Responsible Staff  06/28/23    Name Role Begin End    Stormy Garcia M.D. Anesth 1454 1511        Overtime Reason:  no overtime (within assigned shift)    Comments:

## 2023-06-28 NOTE — ANESTHESIA PREPROCEDURE EVALUATION
Case: [id number] Date/Time: 06/28/23 1445    Procedure: ESOPHAGOGASTRODUODENOSCOPY WITH HELICOBACTER  PYLORI CULTURES    Anesthesia type: MAC    Diagnosis: Helicobacter pylori (H. pylori) [A04.8]    Pre-op diagnosis: HELICOBACTER PYLORI +    Location:  ENDOSCOPIC ULTRASOUND ROOM / SURGERY [facility]    Surgeons: Mian Fang M.D.      58yo female for EGD    Relevant Problems   ANESTHESIA   (positive) PONV (postoperative nausea and vomiting)       Physical Exam    Airway   Mallampati: I  TM distance: >3 FB  Neck ROM: full       Cardiovascular - normal exam  Rhythm: regular  Rate: normal  (-) murmur     Dental - normal exam           Pulmonary - normal exam  Breath sounds clear to auscultation     Abdominal    Neurological - normal exam                 Anesthesia Plan    ASA 2       Plan - general and MAC       Airway plan will be natural airway          Induction: intravenous    Postoperative Plan: Postoperative administration of opioids is intended.    Pertinent diagnostic labs and testing reviewed    Informed Consent:    Anesthetic plan and risks discussed with patient.    Use of blood products discussed with: patient whom consented to blood products.

## 2023-06-28 NOTE — DISCHARGE INSTRUCTIONS
What to Expect Post Anesthesia    Rest and take it easy for the first 24 hours.  A responsible adult is recommended to remain with you during that time.  It is normal to feel sleepy.  We encourage you to not do anything that requires balance, judgment or coordination.    FOR 24 HOURS DO NOT:  Drive, operate machinery or run household appliances.  Drink beer or alcoholic beverages.  Make important decisions or sign legal documents.    To avoid nausea, slowly advance diet as tolerated, avoiding spicy or greasy foods for the first day.  Add more substantial food to your diet according to your provider's instructions.  INCREASE FLUIDS AND FIBER TO AVOID CONSTIPATION.    MILD FLU-LIKE SYMPTOMS ARE NORMAL.  YOU MAY EXPERIENCE GENERALIZED MUSCLE ACHES, THROAT IRRITATION, HEADACHE AND/OR SOME NAUSEA.    If any questions arise, call your provider.  If your provider is not available, please feel free to call the Surgical Center at [phone].    MEDICATIONS: Resume taking daily medication.  Take prescribed pain medication with food.  If no medication is prescribed, you may take non-aspirin pain medication if needed.  PAIN MEDICATION CAN BE VERY CONSTIPATING.  Take a stool softener or laxative such as senokot, pericolace, or milk of magnesia if needed.    No pain medication given in recovery.        Findings:   Subepithelial erythema gastric fundus and body consistent with persistent H pylori infection.  Samples obtained too send out for H pylori culture and antibiotic sensitivity given persistent infection despite multiple prior rounds of antibiotics     ENDOSCOPY HOME CARE INSTRUCTIONS    GASTROSCOPY OR ERCP  1. Don't eat or drink anything for about an hour after the test. You can then resume your regular diet.  2. Don't drive or drink alcohol for 24 hours. The medication you received will make you too drowsy.  3. Don't take any coffee, tea, or aspirin products until after you see your doctor. These can harm the lining  of your stomach.  4. If you begin to vomit bloody material, or develop black or bloody stools, call your doctor as soon as possible.  5. If you have any neck, chest, abdominal pain or temp of 100 degrees, call your doctor.    You should call 010 if you develop problems with breathing or chest pain.  If any questions arise, call your doctor.  Office #[phone]. If your doctor is not available, please feel free to call [phone]. You can also call the [facility] HOTLINE open 24 hours/day, 7 days/week and speak to a nurse at [phone], or toll free [phone].

## 2023-06-28 NOTE — OR SURGEON
Immediate Post OP Note    PreOp Diagnosis: Persistent H pylori gastritis      PostOp Diagnosis: Persistent H pylori gastritis      Procedure(s):  ESOPHAGOGASTRODUODENOSCOPY WITH HELICOBACTER  PYLORI CULTURES and sensitivities- Wound Class: Clean Contaminated    Surgeon(s):  Mian Fang M.D.    Anesthesiologist/Type of Anesthesia:  Anesthesiologist: Stormy Garcia M.D./MAC    Surgical Staff:  Circulator: Aravind Paz R.N.  Endoscopy Technician: Tavia Schmidt    Specimens removed if any:  ID Type Source Tests Collected by Time Destination   A :  Tissue Gastric PATHOLOGY SPECIMEN Mian Fang M.D. 6/28/2023  3:02 PM    B :  Tissue Gastric PATHOLOGY SPECIMEN Mian Fang M.D. 6/28/2023  3:06 PM        Estimated Blood Loss: None    Findings:   Subepithelial erythema gastric fundus and body consistent with persistent H pylori infection.  Samples obtained too send out for H pylori culture and antibiotic sensitivity given persistent infection despite multiple prior rounds of antibiotics    Complications: None        6/28/2023 3:07 PM Mian Fang M.D.

## 2023-06-28 NOTE — OR NURSING
"1510: To PACU from [facility] via gurney, awake, respirations spontaneous and non-labored. Stable on 6L O2 via mask. Denies pain and nausea.     1525: Pt states \"feeling a chest tightness, like an elephant is sitting on my chest.\" Denies SOB or radiating pain.  Pt states had this feeling during recovery in last procedure after receiving antihypertensives. Updated .    1540: Pt states feeling slightly improved but still present. Remains stable on RA.     1545: Report given to Giovanny LÓPEZ.    1552: Bedside EKG done.  "

## 2023-06-28 NOTE — ANESTHESIA POSTPROCEDURE EVALUATION
Patient: Susan Sweeney    Procedure Summary     Date: 06/28/23 Room / Location:  ENDOSCOPIC ULTRASOUND ROOM / SURGERY [facility]    Anesthesia Start: 1454 Anesthesia Stop: 1511    Procedure: ESOPHAGOGASTRODUODENOSCOPY WITH HELICOBACTER  PYLORI CULTURES Diagnosis:       Helicobacter pylori (H. pylori)      (Helicobacter pylori (H. pylori) [041.86.ICD-9-CM])    Surgeons: Mian Fang M.D. Responsible Provider: Stormy Garcia M.D.    Anesthesia Type: general, MAC ASA Status: 2          Final Anesthesia Type: general, MAC  Last vitals  BP   Blood Pressure: (!) 181/102    Temp   36.5 °C (97.7 °F)    Pulse   91   Resp   18    SpO2   97 %      Anesthesia Post Evaluation    Patient location during evaluation: PACU  Patient participation: complete - patient participated  Level of consciousness: awake and alert  Pain score: 0    Airway patency: patent  Anesthetic complications: no  Cardiovascular status: hemodynamically stable  Respiratory status: acceptable  Hydration status: euvolemic    PONV: none          No notable events documented.     Nurse Pain Score: 8 (NPRS)

## 2023-06-29 LAB
EKG IMPRESSION: NORMAL
PATHOLOGY CONSULT NOTE: NORMAL

## 2023-06-29 PROCEDURE — 93010 ELECTROCARDIOGRAM REPORT: CPT | Performed by: INTERNAL MEDICINE

## 2023-06-29 NOTE — OR NURSING
1706: To stage ll. Up to chair and self dressed w/ CNA standby. No pain or nausea.    1716: Home care instructions reviewed w/ pt and . No questions. Meets criteria for discharge.

## 2023-06-29 NOTE — PROCEDURES
DATE OF PROCEDURE:  06/28/2023     PROCEDURE:  Upper endoscopy with biopsies.     :  Mian Fang MD     INDICATIONS:  The patient is a 57-year-old female with persistent H. pylori   infection despite multiple courses of prior antibiotics and ongoing abdominal   bloating and upper abdominal discomfort.  An upper endoscopy is being done to   take biopsies for H. pylori culture and antibiotic sensitivities.     INFORMED CONSENT:  Written informed consent was obtained from the patient   after thorough explanation of indications, benefits, and risks of the   procedure, which included but was not limited to bleeding, infection,   perforation, adverse reaction to medications and missed lesions.     MEDICATIONS GIVEN:  Monitored anesthesia care with propofol.     Continuous telemetry, BP, pulse oximetry, and capnography were performed by   the anesthesiologist throughout the procedure.     Midsize flexible upper endoscope was used for the procedure.     FINDINGS:  The patient was placed in the left lateral decubitus position.    After anesthesia was achieved, the endoscope was passed in the posterior   pharynx under direct visualization, advanced to the second portion of the   duodenum without difficulty.  The patient tolerated the procedure well with   following findings:  1.  Esophagus:  Normal.  2.  Stomach:  She had evidence of ongoing subepithelial erythema within the   gastric fundus and body, concerning for persistent H. pylori infection similar   to appearance of prior endoscopy.  Antrum appeared normal.  Random biopsies   of the stomach with multiple bites in the fundus and body were obtained and   placed into special Brucella Broth sent out for H. pylori culture and   antibiotic sensitivity.  Random biopsies were also obtained and placed in   formalin for pathology.  3.  Duodenum: Normal.     IMPRESSION:  Likely persistent H. pylori infection, biopsies obtained for H.   pylori culture and then  antibiotic sensitivities.     PLAN:  1.  Await H. pylori culture and antibiotic sensitivities to determine   appropriate treatment for ongoing H. pylori infection.  2.  Follow up in GI clinic.        ______________________________  MD RENNY SHARIF/WONG    DD:  06/28/2023 15:12  DT:  06/28/2023 17:47    Job#:  [id number]

## 2023-07-05 NOTE — PROCEDURE: ADDITIONAL NOTES
Additional Notes: Pt reports lesions are resolving and not currently bothersome. Similar lesions are not frequent or recurrent. Opts for expectant management at this time. F/u as needed in case of any worsening or frequently recurring lesions.
Detail Level: Simple
You were seen in the ER for abdominal pain. Your lab results were within normal limits. Your CT scan showed a left ovarian cyst. Please follow up with your obgyn doctor for this finding.    Please follow up with your obgyn.  Please follow up with your primary care doctor.    Please return to the ER if you have worsening symptoms including fever, chest pain, shortness of breath, abdominal pain, nausea, vomiting, diarrhea, weakness or lightheadedness/fainting.

## 2023-07-06 LAB — TEST NAME 95000: NORMAL

## 2023-11-09 PROBLEM — M25.811 IMPINGEMENT OF RIGHT SHOULDER: Status: ACTIVE | Noted: 2023-11-09

## (undated) DEVICE — SENSOR OXIMETER ADULT SPO2 RD SET (20EA/BX)

## (undated) DEVICE — FORCEP RADIAL JAW 4 STANDARD CAPACITY W/NEEDLE 240CM (40EA/BX)

## (undated) DEVICE — SET LEADWIRE 5 LEAD BEDSIDE DISPOSABLE ECG (1SET OF 5/EA)

## (undated) DEVICE — SYRINGE SAFETY 5 ML 18 GA X 1-1/2 BLUNT LL (100/BX 4BX/CA)

## (undated) DEVICE — BLOCK BITE ENDOSCOPIC 2809 - (100/BX) INTERMEDIATE

## (undated) DEVICE — SPONGE GAUZE NON-STERILE 4X4 - (2000/CA 10PK/CA)

## (undated) DEVICE — CUFF BP ADULT LARGE DISPOSABLE (20EA/CA)

## (undated) DEVICE — WATER IRRIGATION STERILE 1000ML (12EA/CA)

## (undated) DEVICE — SOD. CHL. INJ. 0.9% 1000 ML - (14EA/CA 60CA/PF)

## (undated) DEVICE — KIT CUSTOM PROCEDURE SINGLE FOR ENDO  (15/CA)

## (undated) DEVICE — ELECTRODE DUAL RETURN W/ CORD - (50/PK)

## (undated) DEVICE — SET EXTENSION WITH 2 PORTS (48EA/CA) ***PART #2C8610 IS A SUBSTITUTE*****

## (undated) DEVICE — SYRINGE DISP. 60 CC LL - (30/BX, 12BX/CA)**WHEN THESE ARE GONE ORDER #500206**

## (undated) DEVICE — MASK WITH FACE SHIELD (25/BX 4BX/CA)

## (undated) DEVICE — CATHETER IV SAFETY 20 GA X 1-1/4 (50/BX)

## (undated) DEVICE — MASK O2 VNYL ADLT RBRTH HI - (50/CS)

## (undated) DEVICE — TUBE SUCTION YANKAUER  1/4 X 6FT (20EA/CA)"

## (undated) DEVICE — LACTATED RINGERS INJ 1000 ML - (14EA/CA 60CA/PF)

## (undated) DEVICE — GOWN SURGEONS LARGE - (32/CA)

## (undated) DEVICE — TUBING CLEARLINK DUO-VENT - C-FLO (48EA/CA)